# Patient Record
Sex: FEMALE | Race: WHITE | NOT HISPANIC OR LATINO | Employment: UNEMPLOYED | URBAN - METROPOLITAN AREA
[De-identification: names, ages, dates, MRNs, and addresses within clinical notes are randomized per-mention and may not be internally consistent; named-entity substitution may affect disease eponyms.]

---

## 2019-02-21 ENCOUNTER — HOSPITAL ENCOUNTER (EMERGENCY)
Facility: HOSPITAL | Age: 34
Discharge: HOME | End: 2019-02-21
Attending: EMERGENCY MEDICINE
Payer: MEDICAID

## 2019-02-21 VITALS
SYSTOLIC BLOOD PRESSURE: 124 MMHG | TEMPERATURE: 98 F | HEIGHT: 64 IN | BODY MASS INDEX: 22.81 KG/M2 | RESPIRATION RATE: 18 BRPM | OXYGEN SATURATION: 98 % | HEART RATE: 97 BPM | DIASTOLIC BLOOD PRESSURE: 73 MMHG | WEIGHT: 133.63 LBS

## 2019-02-21 DIAGNOSIS — R30.0 DYSURIA: ICD-10-CM

## 2019-02-21 DIAGNOSIS — R03.0 ELEVATED BLOOD PRESSURE READING: ICD-10-CM

## 2019-02-21 DIAGNOSIS — Z20.2 POSSIBLE EXPOSURE TO STD: Primary | ICD-10-CM

## 2019-02-21 LAB

## 2019-02-21 PROCEDURE — 96372 THER/PROPH/DIAG INJ SC/IM: CPT

## 2019-02-21 PROCEDURE — 81003 URINALYSIS AUTO W/O SCOPE: CPT

## 2019-02-21 PROCEDURE — 99284 EMERGENCY DEPT VISIT MOD MDM: CPT | Mod: 25

## 2019-02-21 PROCEDURE — 81025 URINE PREGNANCY TEST: CPT

## 2019-02-21 PROCEDURE — 87491 CHLMYD TRACH DNA AMP PROBE: CPT

## 2019-02-21 PROCEDURE — 25000003 PHARM REV CODE 250: Performed by: PHYSICIAN ASSISTANT

## 2019-02-21 PROCEDURE — 63600175 PHARM REV CODE 636 W HCPCS: Performed by: PHYSICIAN ASSISTANT

## 2019-02-21 RX ORDER — CEFTRIAXONE 250 MG/1
250 INJECTION, POWDER, FOR SOLUTION INTRAMUSCULAR; INTRAVENOUS
Status: COMPLETED | OUTPATIENT
Start: 2019-02-21 | End: 2019-02-21

## 2019-02-21 RX ORDER — AZITHROMYCIN 250 MG/1
1000 TABLET, FILM COATED ORAL
Status: COMPLETED | OUTPATIENT
Start: 2019-02-21 | End: 2019-02-21

## 2019-02-21 RX ADMIN — CEFTRIAXONE SODIUM 250 MG: 250 INJECTION, POWDER, FOR SOLUTION INTRAMUSCULAR; INTRAVENOUS at 10:02

## 2019-02-21 RX ADMIN — AZITHROMYCIN 1000 MG: 250 TABLET, FILM COATED ORAL at 10:02

## 2019-02-22 LAB
C TRACH DNA SPEC QL NAA+PROBE: NOT DETECTED
N GONORRHOEA DNA SPEC QL NAA+PROBE: NOT DETECTED

## 2019-02-22 NOTE — ED PROVIDER NOTES
SCRIBE #1 NOTE: I, Corinne Mack, am scribing for, and in the presence of, Erica Avalos PA-C. I have scribed the entire note.      History      Chief Complaint   Patient presents with    Female  Problem     States burning pain     Rash     To face for 1 month       Review of patient's allergies indicates:   Allergen Reactions    Benadryl allergy decongestant Shortness Of Breath    Sulfa (sulfonamide antibiotics) Swelling        HPI   HPI    2/21/2019, 9:04 PM   History obtained from the patient      History of Present Illness: Jeanie Wong is a 33 y.o. female patient with PMHx of Hep C and cervical CA who presents to the Emergency Department for dysuria which onset gradually a week ago. Pt states she had unprotected sex over a week ago and would like to be screened for STDs as well as a UTI. Symptoms are episodic and moderate in severity. Pt also c/o rash to her face which onset gradually 1 month ago. No mitigating or exacerbating factors reported. No associated sxs reported. Patient denies any fever, chills, CP, SOB, N/V/D, back pain, neck pain, vaginal pain, vaginal bleeding, vaginal d/c, pelvic pain, hematuria, flank pain, HA, dizziness, and all other sxs at this time. No prior Tx reported. No further complaints or concerns at this time. Pt's LNMP was 1.5 weeks ago.        Arrival mode: Personal vehicle    PCP: Primary Doctor No       Past Medical History:  Past Medical History:   Diagnosis Date    Cancer     cervix    Hepatitis C        Past Surgical History:  Past Surgical History:   Procedure Laterality Date    colonoscopy      ESOPHAGOGASTRODUODENOSCOPY      hemorhoidectomy           Family History:  History reviewed. No pertinent family history.    Social History:  Social History     Tobacco Use    Smoking status: Current Every Day Smoker     Packs/day: 0.50     Years: 13.00     Pack years: 6.50     Types: Cigarettes    Smokeless tobacco: Never Used   Substance and Sexual Activity     Alcohol use: No    Drug use: No    Sexual activity: Yes     Partners: Male     Birth control/protection: None       ROS   Review of Systems   Constitutional: Negative for chills and fever.   Respiratory: Negative for cough and shortness of breath.    Cardiovascular: Negative for chest pain and leg swelling.   Gastrointestinal: Negative for abdominal pain, diarrhea, nausea and vomiting.   Genitourinary: Positive for dysuria. Negative for flank pain, hematuria, pelvic pain, vaginal bleeding, vaginal discharge and vaginal pain.   Musculoskeletal: Negative for back pain, neck pain and neck stiffness.   Skin: Positive for rash. Negative for wound.   Neurological: Negative for dizziness, light-headedness, numbness and headaches.   All other systems reviewed and are negative.    Physical Exam      Initial Vitals [02/21/19 2056]   BP Pulse Resp Temp SpO2   124/73 97 18 98.3 °F (36.8 °C) 98 %      MAP       --          Physical Exam  Nursing Notes and Vital Signs Reviewed.  Constitutional: Patient is in no acute distress. Well-developed and well-nourished.  Head: Atraumatic. Normocephalic.  Eyes: PERRL. EOM intact. Conjunctivae are not pale. No scleral icterus.  ENT: Mucous membranes are moist. Oropharynx is clear and symmetric.    Neck: Supple. Full ROM. No lymphadenopathy.  Cardiovascular: Regular rate. Regular rhythm. No murmurs, rubs, or gallops. Distal pulses are 2+ and symmetric.  Pulmonary/Chest: No respiratory distress. Clear to auscultation bilaterally. No wheezing or rales.  Abdominal: Soft and non-distended.  There is no tenderness.  No rebound, guarding, or rigidity.  Genitourinary: No CVA TTP.   Musculoskeletal: Moves all extremities. No obvious deformities. No edema.   Skin: Warm and dry.  Papular rash of face.  Neurological:  Alert, awake, and appropriate.  Normal speech.  No acute focal neurological deficits are appreciated.  Psychiatric: Normal affect. Good eye contact. Appropriate in content.    ED  "Course    Procedures  ED Vital Signs:  Vitals:    02/21/19 2056   BP: 124/73   Pulse: 97   Resp: 18   Temp: 98.3 °F (36.8 °C)   TempSrc: Oral   SpO2: 98%   Weight: 60.6 kg (133 lb 9.6 oz)   Height: 5' 4" (1.626 m)       Abnormal Lab Results:  Labs Reviewed   C. TRACHOMATIS/N. GONORRHOEAE BY AMP DNA   URINALYSIS, REFLEX TO URINE CULTURE    Narrative:     Preferred Collection Type->Urine, Clean Catch   PREGNANCY TEST, URINE RAPID        All Lab Results:  Results for orders placed or performed during the hospital encounter of 02/21/19   Urinalysis, Reflex to Urine Culture Urine, Clean Catch   Result Value Ref Range    Specimen UA Urine, Clean Catch     Color, UA Yellow Yellow, Straw, Maris    Appearance, UA Clear Clear    pH, UA 7.0 5.0 - 8.0    Specific Gravity, UA 1.010 1.005 - 1.030    Protein, UA Negative Negative    Glucose, UA Negative Negative    Ketones, UA Negative Negative    Bilirubin (UA) Negative Negative    Occult Blood UA Negative Negative    Nitrite, UA Negative Negative    Urobilinogen, UA Negative <2.0 EU/dL    Leukocytes, UA Negative Negative   Rapid Pregnancy, Urine   Result Value Ref Range    Preg Test, Ur Negative        Imaging Results:  Imaging Results    None                 The Emergency Provider reviewed the vital signs and test results, which are outlined above.    ED Discussion     9:50 PM: Reassessed pt at this time. Pt is awake, alert, and in no distress. Discussed with pt all pertinent ED information and results. Discussed pt dx and plan of tx. Gave pt all f/u and return to the ED instructions. All questions and concerns were addressed at this time. Pt expresses understanding of information and instructions, and is comfortable with plan to discharge. Pt is stable for discharge.    I discussed with patient and/or family/caretaker that evaluation in the ED does not suggest any emergent or life threatening medical conditions requiring immediate intervention beyond what was provided in the " ED, and I believe patient is safe for discharge.  Regardless, an unremarkable evaluation in the ED does not preclude the development or presence of a serious of life threatening condition. As such, patient was instructed to return immediately for any worsening or change in current symptoms.      ED Medication(s):  Medications   cefTRIAXone injection 250 mg (250 mg Intramuscular Given 2/21/19 2231)   azithromycin tablet 1,000 mg (1,000 mg Oral Given 2/21/19 2230)          Medication List      You have not been prescribed any medications.         Follow-up Information     [facility]. Schedule an appointment as soon as possible for a visit in 3 days.    Why:  PCP and dermatology  Contact information:  [facility] [address]  [place] [address]    Phone:  [phone]                   Medical Decision Making    Medical Decision Making:   Clinical Tests:   Lab Tests: Ordered and Reviewed     Additional MDM:   Smoking Cessation: The patient is a smoker. The patient was counseled on smoking cessation for: 3 minutes. The patient was counseled on tobacco related  health complications.        Scribe Attestation:   Scribe #1: I performed the above scribed service and the documentation accurately describes the services I performed. I attest to the accuracy of the note 02/21/2019.    Attending:   Physician Attestation Statement for Scribe #1: I, Erica Avalos PA-C, personally performed the services described in this documentation, as scribed by Corinne Mack, in my presence, and it is both accurate and complete.          Clinical Impression       ICD-10-CM ICD-9-CM   1. Possible exposure to STD Z20.2 V01.6   2. Dysuria R30.0 788.1   3. Elevated blood pressure reading R03.0 796.2       Disposition:   Disposition: Discharged  Condition: Stable           Erica Avalos PA-C  02/22/19 0129

## 2019-07-15 ENCOUNTER — HOSPITAL ENCOUNTER (EMERGENCY)
Facility: HOSPITAL | Age: 34
Discharge: HOME | End: 2019-07-15
Attending: EMERGENCY MEDICINE
Payer: MEDICAID

## 2019-07-15 VITALS
OXYGEN SATURATION: 98 % | HEART RATE: 87 BPM | WEIGHT: 141.13 LBS | BODY MASS INDEX: 24.1 KG/M2 | SYSTOLIC BLOOD PRESSURE: 120 MMHG | DIASTOLIC BLOOD PRESSURE: 67 MMHG | RESPIRATION RATE: 16 BRPM | HEIGHT: 64 IN | TEMPERATURE: 99 F

## 2019-07-15 DIAGNOSIS — R10.32 BILATERAL LOWER ABDOMINAL CRAMPING: ICD-10-CM

## 2019-07-15 DIAGNOSIS — Z34.90 PREGNANCY, UNSPECIFIED GESTATIONAL AGE: Primary | ICD-10-CM

## 2019-07-15 DIAGNOSIS — R10.31 BILATERAL LOWER ABDOMINAL CRAMPING: ICD-10-CM

## 2019-07-15 LAB
B-HCG UR QL: POSITIVE
BILIRUB UR QL STRIP: NEGATIVE
CLARITY UR: CLEAR
COLOR UR: YELLOW
GLUCOSE UR QL STRIP: NEGATIVE
HCG INTACT+B SERPL-ACNC: 343 MIU/ML
HGB UR QL STRIP: NEGATIVE
KETONES UR QL STRIP: NEGATIVE
LEUKOCYTE ESTERASE UR QL STRIP: NEGATIVE
NITRITE UR QL STRIP: NEGATIVE
PH UR STRIP: 6 [PH] (ref 5–8)
PROT UR QL STRIP: NEGATIVE
SP GR UR STRIP: 1.02 (ref 1–1.03)
URN SPEC COLLECT METH UR: NORMAL
UROBILINOGEN UR STRIP-ACNC: NEGATIVE EU/DL

## 2019-07-15 PROCEDURE — 81003 URINALYSIS AUTO W/O SCOPE: CPT

## 2019-07-15 PROCEDURE — 81025 URINE PREGNANCY TEST: CPT

## 2019-07-15 PROCEDURE — 84702 CHORIONIC GONADOTROPIN TEST: CPT

## 2019-07-15 PROCEDURE — 36415 COLL VENOUS BLD VENIPUNCTURE: CPT

## 2019-07-15 PROCEDURE — 99282 EMERGENCY DEPT VISIT SF MDM: CPT

## 2019-07-16 NOTE — ED PROVIDER NOTES
SCRIBE #1 NOTE: I, Dora Mao, am scribing for, and in the presence of, Deepak Spence NP. I have scribed the HPI, ROS, and PEx.     SCRIBE #2 NOTE: I, Venice Tuckerlando, am scribing for, and in the presence of,  Deepak Spence NP. I have scribed the remaining portions of the note not scribed by Scribe #1.     History      Chief Complaint   Patient presents with    Abdominal Cramping     pt reports positive pregnancy test yesterday. Pt reports lower abdominal cramping. Pt reports that she wants to confirm pregnancy       Review of patient's allergies indicates:   Allergen Reactions    Benadryl allergy decongestant Shortness Of Breath    Sulfa (sulfonamide antibiotics) Swelling        HPI   HPI    7/15/2019, 8:55 PM   History obtained from the patient      History of Present Illness: Jeanie Wong is a 34 y.o. female patient who presents to the Emergency Department for abdominal cramping which onset gradually yesterday. Symptoms are constant and moderate in severity. Patient reports a positive pregnancy test taken last night. No mitigating or exacerbating factors reported. No associated sxs. Patient denies any fever, chills, n/v/d, vaginal bleeding, back pain, and all other sxs at this time. No prior Tx. No further complaints or concerns at this time.          Arrival mode: Personal vehicle     PCP: Primary Doctor No       Past Medical History:  Past Medical History:   Diagnosis Date    Cancer     cervix    Hepatitis C        Past Surgical History:  Past Surgical History:   Procedure Laterality Date    colonoscopy      ESOPHAGOGASTRODUODENOSCOPY      hemorhoidectomy           Family History:  No family history on file.    Social History:  Social History     Tobacco Use    Smoking status: Current Every Day Smoker     Packs/day: 0.50     Years: 13.00     Pack years: 6.50     Types: Cigarettes    Smokeless tobacco: Never Used   Substance and Sexual Activity    Alcohol use: No    Drug use: No    Sexual  activity: Yes     Partners: Male     Birth control/protection: None       ROS   Review of Systems   Constitutional: Negative for chills and fever.   HENT: Negative for sore throat.    Respiratory: Negative for shortness of breath.    Cardiovascular: Negative for chest pain.   Gastrointestinal: Positive for abdominal pain (cramping). Negative for diarrhea, nausea and vomiting.   Genitourinary: Negative for dysuria and vaginal bleeding.   Musculoskeletal: Negative for back pain.   Skin: Negative for rash.   Neurological: Negative for weakness.   Hematological: Does not bruise/bleed easily.   All other systems reviewed and are negative.    Physical Exam      Initial Vitals [07/15/19 2011]   BP Pulse Resp Temp SpO2   117/71 92 18 99.4 °F (37.4 °C) 98 %      MAP       --          Physical Exam  Nursing Notes and Vital Signs Reviewed.  Constitutional: Patient is in no acute distress. Well-developed and well-nourished.  Head: Atraumatic. Normocephalic.  Eyes: PERRL. EOM intact. Conjunctivae are not pale. No scleral icterus.  ENT: Mucous membranes are moist. Oropharynx is clear and symmetric.    Neck: Supple. Full ROM. No lymphadenopathy.  Cardiovascular: Regular rate. Regular rhythm. No murmurs, rubs, or gallops. Distal pulses are 2+ and symmetric.  Pulmonary/Chest: No respiratory distress. Clear to auscultation bilaterally. No wheezing or rales.  Abdominal: Soft and non-distended.  There is no tenderness.  No rebound, guarding, or rigidity. Good bowel sounds.  Genitourinary: No CVA tenderness  Musculoskeletal: Moves all extremities. No obvious deformities. No edema. No calf tenderness.  Skin: Warm and dry.  Neurological:  Alert, awake, and appropriate.  Normal speech.  No acute focal neurological deficits are appreciated.  Psychiatric: Normal affect. Good eye contact. Appropriate in content.    ED Course    Procedures  ED Vital Signs:  Vitals:    07/15/19 2011 07/15/19 2238   BP: 117/71 120/67   Pulse: 92 87   Resp: 18 16  "  Temp: 99.4 °F (37.4 °C) 98.9 °F (37.2 °C)   TempSrc: Oral Oral   SpO2: 98% 98%   Weight: 64 kg (141 lb 1.5 oz)    Height: 5' 4" (1.626 m)        Abnormal Lab Results:  Labs Reviewed   PREGNANCY TEST, URINE RAPID - Abnormal; Notable for the following components:       Result Value    Preg Test, Ur Positive (*)     All other components within normal limits   URINALYSIS, REFLEX TO URINE CULTURE    Narrative:     Preferred Collection Type->Urine, Clean Catch   HCG, QUANTITATIVE, PREGNANCY        All Lab Results:  Positive pregnancy test resulted.  UA is WNL.    Imaging Results:  Imaging Results    None                 The Emergency Provider reviewed the vital signs and test results, which are outlined above.    ED Discussion     10:39 PM: Reassessed pt at this time. Pt does not wish to wait for results or get US. Discussed with pt all pertinent ED information and results. Discussed pt dx and plan of tx. Gave pt all f/u and return to the ED instructions. All questions and concerns were addressed at this time. Pt expresses understanding of information and instructions, and is comfortable with plan to discharge. Pt is stable for discharge.    I discussed with patient that evaluation in the ED does not suggest any emergent or life threatening medical conditions requiring immediate intervention beyond what was provided in the ED, and I believe patient is safe for discharge.  Regardless, an unremarkable evaluation in the ED does not preclude the development or presence of a serious of life threatening condition. As such, patient was instructed to return immediately for any worsening or change in current symptoms.      ED Medication(s):  Medications - No data to display    Follow-up Information     Schedule an appointment as soon as possible for a visit  with [facility].    Specialty:  Obstetrics and Gynecology  Contact information:  [address] [facility] [address] [phone]  Additional " information:  ([address]) 3rd floor           [facility]    Specialty:  Emergency Medicine  Why:  As needed, If symptoms worsen  Contact information:  [address]  [facility] [address] [phone]                   Medical Decision Making    Medical Decision Making:   Clinical Tests:   Lab Tests: Ordered and Reviewed           Scribe Attestation:   Scribe #1: I performed the above scribed service and the documentation accurately describes the services I performed. I attest to the accuracy of the note.    Attending:   Physician Attestation Statement for Scribe #1: I, Deepak Spence NP , personally performed the services described in this documentation, as scribed by Dora Mao, in my presence, and it is both accurate and complete.       Scribe Attestation:   Scribe #2: I performed the above scribed service and the documentation accurately describes the services I performed. I attest to the accuracy of the note.    Attending Attestation:           Physician Attestation for Scribe:    Physician Attestation Statement for Scribe #2: I, Deepak Spence NP, reviewed documentation, as scribed by Venice Stewart in my presence, and it is both accurate and complete. I also acknowledge and confirm the content of the note done by Mai #1.          Clinical Impression       ICD-10-CM ICD-9-CM   1. Pregnancy, unspecified gestational age Z34.90 V22.2   2. Bilateral lower abdominal cramping R10.31 789.09    R10.32        Disposition:   Disposition: Discharged  Condition: Stable         Deepak Spence NP  07/16/19 0141

## 2019-07-18 ENCOUNTER — INITIAL PRENATAL (OUTPATIENT)
Dept: OBSTETRICS AND GYNECOLOGY | Facility: CLINIC | Age: 34
End: 2019-07-18
Payer: MEDICAID

## 2019-07-18 ENCOUNTER — LAB VISIT (OUTPATIENT)
Dept: LAB | Facility: HOSPITAL | Age: 34
End: 2019-07-18
Attending: ADVANCED PRACTICE MIDWIFE
Payer: MEDICAID

## 2019-07-18 VITALS
SYSTOLIC BLOOD PRESSURE: 124 MMHG | BODY MASS INDEX: 24.41 KG/M2 | WEIGHT: 142.19 LBS | DIASTOLIC BLOOD PRESSURE: 80 MMHG

## 2019-07-18 DIAGNOSIS — Z34.80 ENCOUNTER FOR SUPERVISION OF NORMAL PREGNANCY IN MULTIGRAVIDA: ICD-10-CM

## 2019-07-18 DIAGNOSIS — F19.11 HISTORY OF DRUG ABUSE: ICD-10-CM

## 2019-07-18 DIAGNOSIS — Z87.42 HX OF ABNORMAL CERVICAL PAP SMEAR: ICD-10-CM

## 2019-07-18 DIAGNOSIS — B18.2 CHRONIC HEPATITIS C WITHOUT HEPATIC COMA: ICD-10-CM

## 2019-07-18 LAB
ABO + RH BLD: NORMAL
ALBUMIN SERPL BCP-MCNC: 4.1 G/DL (ref 3.5–5.2)
ALP SERPL-CCNC: 60 U/L (ref 55–135)
ALT SERPL W/O P-5'-P-CCNC: 50 U/L (ref 10–44)
AMPHET+METHAMPHET UR QL: NEGATIVE
ANION GAP SERPL CALC-SCNC: 9 MMOL/L (ref 8–16)
AST SERPL-CCNC: 35 U/L (ref 10–40)
BARBITURATES UR QL SCN>200 NG/ML: NEGATIVE
BASOPHILS # BLD AUTO: 0.08 K/UL (ref 0–0.2)
BASOPHILS NFR BLD: 0.9 % (ref 0–1.9)
BENZODIAZ UR QL SCN>200 NG/ML: NEGATIVE
BILIRUB SERPL-MCNC: 0.4 MG/DL (ref 0.1–1)
BLD GP AB SCN CELLS X3 SERPL QL: NORMAL
BUN SERPL-MCNC: 12 MG/DL (ref 6–20)
BZE UR QL SCN: NEGATIVE
CALCIUM SERPL-MCNC: 9.5 MG/DL (ref 8.7–10.5)
CANNABINOIDS UR QL SCN: NEGATIVE
CHLORIDE SERPL-SCNC: 108 MMOL/L (ref 95–110)
CO2 SERPL-SCNC: 22 MMOL/L (ref 23–29)
CREAT SERPL-MCNC: 0.7 MG/DL (ref 0.5–1.4)
CREAT UR-MCNC: 92 MG/DL (ref 15–325)
DIFFERENTIAL METHOD: NORMAL
EOSINOPHIL # BLD AUTO: 0.5 K/UL (ref 0–0.5)
EOSINOPHIL NFR BLD: 5.1 % (ref 0–8)
ERYTHROCYTE [DISTWIDTH] IN BLOOD BY AUTOMATED COUNT: 13.2 % (ref 11.5–14.5)
EST. GFR  (AFRICAN AMERICAN): >60 ML/MIN/1.73 M^2
EST. GFR  (NON AFRICAN AMERICAN): >60 ML/MIN/1.73 M^2
GLUCOSE SERPL-MCNC: 81 MG/DL (ref 70–110)
HCG INTACT+B SERPL-ACNC: 982 MIU/ML
HCT VFR BLD AUTO: 43.5 % (ref 37–48.5)
HGB BLD-MCNC: 14.4 G/DL (ref 12–16)
IMM GRANULOCYTES # BLD AUTO: 0.03 K/UL (ref 0–0.04)
IMM GRANULOCYTES NFR BLD AUTO: 0.3 % (ref 0–0.5)
LYMPHOCYTES # BLD AUTO: 3.2 K/UL (ref 1–4.8)
LYMPHOCYTES NFR BLD: 34.7 % (ref 18–48)
MCH RBC QN AUTO: 30.6 PG (ref 27–31)
MCHC RBC AUTO-ENTMCNC: 33.1 G/DL (ref 32–36)
MCV RBC AUTO: 93 FL (ref 82–98)
METHADONE UR QL SCN>300 NG/ML: NEGATIVE
MONOCYTES # BLD AUTO: 0.7 K/UL (ref 0.3–1)
MONOCYTES NFR BLD: 7.8 % (ref 4–15)
NEUTROPHILS # BLD AUTO: 4.7 K/UL (ref 1.8–7.7)
NEUTROPHILS NFR BLD: 51.2 % (ref 38–73)
NRBC BLD-RTO: 0 /100 WBC
OPIATES UR QL SCN: NEGATIVE
PCP UR QL SCN>25 NG/ML: NEGATIVE
PLATELET # BLD AUTO: 243 K/UL (ref 150–350)
PMV BLD AUTO: 11.8 FL (ref 9.2–12.9)
POTASSIUM SERPL-SCNC: 4.1 MMOL/L (ref 3.5–5.1)
PROT SERPL-MCNC: 7.3 G/DL (ref 6–8.4)
RBC # BLD AUTO: 4.7 M/UL (ref 4–5.4)
SODIUM SERPL-SCNC: 139 MMOL/L (ref 136–145)
TOXICOLOGY INFORMATION: NORMAL
WBC # BLD AUTO: 9.08 K/UL (ref 3.9–12.7)

## 2019-07-18 PROCEDURE — 87481 CANDIDA DNA AMP PROBE: CPT | Mod: 59

## 2019-07-18 PROCEDURE — 99212 OFFICE O/P EST SF 10 MIN: CPT | Mod: PBBFAC,TH,25 | Performed by: ADVANCED PRACTICE MIDWIFE

## 2019-07-18 PROCEDURE — 85025 COMPLETE CBC W/AUTO DIFF WBC: CPT

## 2019-07-18 PROCEDURE — 86703 HIV-1/HIV-2 1 RESULT ANTBDY: CPT

## 2019-07-18 PROCEDURE — 87086 URINE CULTURE/COLONY COUNT: CPT

## 2019-07-18 PROCEDURE — 80053 COMPREHEN METABOLIC PANEL: CPT

## 2019-07-18 PROCEDURE — 36415 COLL VENOUS BLD VENIPUNCTURE: CPT

## 2019-07-18 PROCEDURE — 99999 PR PBB SHADOW E&M-EST. PATIENT-LVL II: ICD-10-PCS | Mod: PBBFAC,,, | Performed by: ADVANCED PRACTICE MIDWIFE

## 2019-07-18 PROCEDURE — 87491 CHLMYD TRACH DNA AMP PROBE: CPT

## 2019-07-18 PROCEDURE — 88142 CYTOPATH C/V THIN LAYER: CPT

## 2019-07-18 PROCEDURE — 80307 DRUG TEST PRSMV CHEM ANLYZR: CPT

## 2019-07-18 PROCEDURE — 86762 RUBELLA ANTIBODY: CPT

## 2019-07-18 PROCEDURE — 87522 HEPATITIS C REVRS TRNSCRPJ: CPT

## 2019-07-18 PROCEDURE — 86901 BLOOD TYPING SEROLOGIC RH(D): CPT

## 2019-07-18 PROCEDURE — 99204 OFFICE O/P NEW MOD 45 MIN: CPT | Mod: TH,S$PBB,, | Performed by: ADVANCED PRACTICE MIDWIFE

## 2019-07-18 PROCEDURE — 84702 CHORIONIC GONADOTROPIN TEST: CPT

## 2019-07-18 PROCEDURE — 99999 PR PBB SHADOW E&M-EST. PATIENT-LVL II: CPT | Mod: PBBFAC,,, | Performed by: ADVANCED PRACTICE MIDWIFE

## 2019-07-18 PROCEDURE — 87340 HEPATITIS B SURFACE AG IA: CPT

## 2019-07-18 PROCEDURE — 99204 PR OFFICE/OUTPT VISIT, NEW, LEVL IV, 45-59 MIN: ICD-10-PCS | Mod: TH,S$PBB,, | Performed by: ADVANCED PRACTICE MIDWIFE

## 2019-07-18 PROCEDURE — 86592 SYPHILIS TEST NON-TREP QUAL: CPT

## 2019-07-18 RX ORDER — FERROUS SULFATE 325(65) MG
TABLET ORAL
COMMUNITY

## 2019-07-18 RX ORDER — VALACYCLOVIR HYDROCHLORIDE 1 G/1
TABLET, FILM COATED ORAL
Refills: 0 | COMMUNITY
Start: 2019-06-13

## 2019-07-18 RX ORDER — METHYLPREDNISOLONE 4 MG/1
TABLET ORAL
COMMUNITY
End: 2019-07-18

## 2019-07-18 RX ORDER — DOXYCYCLINE 100 MG/1
CAPSULE ORAL
COMMUNITY
End: 2019-07-18

## 2019-07-18 RX ORDER — NALTREXONE HYDROCHLORIDE 50 MG/1
TABLET, FILM COATED ORAL
COMMUNITY
End: 2019-07-18

## 2019-07-18 NOTE — PROGRESS NOTES
CHIEF COMPLAINT:   Patient presents with      Possible Pregnancy        HISTORY OF PRESENT ILLNESS  Jeanie Wong 34 y.o.  presents for pregnancy risk assessment.   The patient has no complaints today.  No nausea or vomiting. No bleeding or pain.  Pregnancy was not  planned but is desired.  Partner is supportive of pregnancy.  Lives at [facility].  No pets at home.  Works a [employer].  Denies domestic abuse.  Denies chemical/pesticide/radiation exposure.  FOC is [place].  Both have hepatitis C    OB history:      LMP: Patient's last menstrual period was 2019.  EDC:  3/22/2019  EGA:  4w4d    Health Maintenance   Topic Date Due    Lipid Panel  1984    TETANUS VACCINE  2002    Pneumococcal Vaccine (Medium Risk) (1 of 1 - PPSV23) 2003    Pap Smear with HPV Cotest  2005    Influenza Vaccine  2019       Past Medical History:   Diagnosis Date    Cancer     cervix    Hepatitis C        Past Surgical History:   Procedure Laterality Date    colonoscopy      ESOPHAGOGASTRODUODENOSCOPY      hemorhoidectomy         Family History   Problem Relation Age of Onset    Cancer Mother     Cancer Maternal Aunt        Social History     Socioeconomic History    Marital status: Legally      Spouse name: Not on file    Number of children: Not on file    Years of education: Not on file    Highest education level: Not on file   Occupational History    Not on file   Social Needs    Financial resource strain: Not on file    Food insecurity:     Worry: Not on file     Inability: Not on file    Transportation needs:     Medical: Not on file     Non-medical: Not on file   Tobacco Use    Smoking status: Current Every Day Smoker     Packs/day: 0.50     Years: 13.00     Pack years: 6.50     Types: Cigarettes    Smokeless tobacco: Never Used   Substance and Sexual Activity    Alcohol use: No    Drug use: No    Sexual activity: Yes      Partners: Male     Birth control/protection: None   Lifestyle    Physical activity:     Days per week: Not on file     Minutes per session: Not on file    Stress: Not on file   Relationships    Social connections:     Talks on phone: Not on file     Gets together: Not on file     Attends [employer] service: Not on file     Active member of club or organization: Not on file     Attends meetings of clubs or organizations: Not on file     Relationship status: Not on file   Other Topics Concern    Not on file   Social History Narrative    Not on file       Current Outpatient Medications   Medication Sig Dispense Refill    ferrous sulfate (FEOSOL) 325 mg (65 mg iron) Tab tablet ferrous sulfate 325 mg (65 mg iron) tablet      valACYclovir (VALTREX) 1000 MG tablet TK 1 T PO Q 8 H  0     No current facility-administered medications for this visit.        Review of patient's allergies indicates:   Allergen Reactions    Benadryl allergy decongestant Shortness Of Breath    Sulfa (sulfonamide antibiotics) Swelling         PHYSICAL EXAM   Vitals:    07/18/19 1425   BP: 124/80        PAIN SCALE: 0/10 None    PHYSICAL EXAM    ROS:  GENERAL: No fever, chills, fatigability or weight loss.  CV: Denies chest pain  PULM: Denies shortness of breath or wheezing.  ABDOMEN: Appetite fine. No weight loss. Denies diarrhea, abdominal pain, hematemesis or blood in stool.  URINARY: No flank pain, dysuria or hematuria.  REPRODUCTIVE: No abnormal vaginal bleeding.       PE:   APPEARANCE: Well nourished, well developed, in no acute distress  CHEST: Clear to auscultation bilaterally  CV: Regular rate and rhythm  BREASTS: Symmetrical, no skin changes or visible lesions. No palpable masses, nipple discharge or adenopathy bilaterally.  ABDOMEN: Soft. No tenderness or masses. No hepatosplenomegaly. No hernias  PELVIC:   VULVA: No lesions. Normal female genitalia.  URETHRAL MEATUS: Normal size and location, no lesions, no prolapse.  URETHRA: No  masses, tenderness, prolapse or scarring.  VAGINA: Moist and well rugated, no discharge, no significant cystocele or rectocele.  CERVIX: No lesions, normal diameter, no stenosis, no cervical motion tenderness.   UTERUS: normal size, regular shape, mobile, non-tender, normal position, good support.  ADNEXA: No masses, tenderness or CDS nodularity.  ANUS PERINEUM: No lesions, no relaxation, no external hemorrhoids.     UPT +    A/P:     -      Patient was counseled today on A.C.S. Pap guidelines and recommendations for yearly pelvic exams, mammograms and monthly self breast exams; to see her PCP for other health maintenance and pregnancy.   -      Patient's medications and medical history reviewed with patient and implications in pregnancy.   -      Pregnancy course discussed and 'AtoZ' book given. Patient was counseled on proper weight gain based on the [place] of [facility]'s recommendations based on her pre-pregnancy weight. Discussed foods to avoid in pregnancy (i.e. sushi, fish that are high in mercury, deli meat, and unpasteurized cheeses). Discussed prenatal vitamin options (i.e. stool softener, DHA). Discussed potential medical problems in pregnancy.  -     Discussed risk of Toxoplasmosis transmission from pets and reviewed risk reduction techniques.  -     Discused increased risks with AMA status.    -     Chromosomal abnormality risk discussed and available testing offered discussed   -     Pt was counseled on exercise in pregnancy and weight gain recommendations.  -     Pt was counseled on travel recommendations and on risks of Zika virus exposure.  Current CDC Zika advisories and prevention techniques were reviewed with pt.  Pt denies any recent international travel and  does not plan travel during pregnancy.  Pt reports that partner does not plan travel either  -     Oriented to practice including CNM collaboration.     NOB labs, PAP, and cultures today X  hAS HER 2 OTHER CHILDREN  RTC 4 weeks with  OB US

## 2019-07-19 LAB
BACTERIA UR CULT: NORMAL
BACTERIAL VAGINOSIS DNA: POSITIVE
C TRACH DNA SPEC QL NAA+PROBE: NOT DETECTED
CANDIDA GLABRATA DNA: NEGATIVE
CANDIDA KRUSEI DNA: NEGATIVE
CANDIDA RRNA VAG QL PROBE: NEGATIVE
HBV SURFACE AG SERPL QL IA: NEGATIVE
HIV 1+2 AB+HIV1 P24 AG SERPL QL IA: NEGATIVE
N GONORRHOEA DNA SPEC QL NAA+PROBE: NOT DETECTED
RPR SER QL: NORMAL
RUBV IGG SER-ACNC: 65.1 IU/ML
RUBV IGG SER-IMP: REACTIVE
T VAGINALIS RRNA GENITAL QL PROBE: NEGATIVE

## 2019-07-23 LAB
HCV RNA SERPL NAA+PROBE-LOG IU: 4.92 LOG (10) IU/ML
HCV RNA SERPL QL NAA+PROBE: DETECTED IU/ML
HCV RNA SPEC NAA+PROBE-ACNC: ABNORMAL IU/ML

## 2019-07-24 DIAGNOSIS — B18.2 CHRONIC HEPATITIS C WITH HEPATIC COMA: Primary | ICD-10-CM

## 2022-04-14 ENCOUNTER — HISTORICAL (OUTPATIENT)
Dept: ADMINISTRATIVE | Facility: HOSPITAL | Age: 37
End: 2022-04-14

## 2022-06-07 ENCOUNTER — HOSPITAL ENCOUNTER (OUTPATIENT)
Dept: RADIOLOGY | Facility: HOSPITAL | Age: 37
Discharge: HOME | End: 2022-06-07
Attending: NURSE PRACTITIONER
Payer: MEDICAID

## 2022-06-07 ENCOUNTER — OFFICE VISIT (OUTPATIENT)
Dept: ORTHOPEDICS | Facility: CLINIC | Age: 37
End: 2022-06-07
Payer: MEDICAID

## 2022-06-07 VITALS
BODY MASS INDEX: 27.83 KG/M2 | HEART RATE: 92 BPM | WEIGHT: 163 LBS | DIASTOLIC BLOOD PRESSURE: 72 MMHG | RESPIRATION RATE: 16 BRPM | TEMPERATURE: 98 F | OXYGEN SATURATION: 98 % | SYSTOLIC BLOOD PRESSURE: 107 MMHG | HEIGHT: 64 IN

## 2022-06-07 DIAGNOSIS — M17.12 PRIMARY OSTEOARTHRITIS OF LEFT KNEE: ICD-10-CM

## 2022-06-07 DIAGNOSIS — M25.562 ACUTE PAIN OF LEFT KNEE: ICD-10-CM

## 2022-06-07 DIAGNOSIS — M23.92 INTERNAL DERANGEMENT OF LEFT KNEE: Primary | ICD-10-CM

## 2022-06-07 PROCEDURE — 1160F RVW MEDS BY RX/DR IN RCRD: CPT | Mod: CPTII,,, | Performed by: NURSE PRACTITIONER

## 2022-06-07 PROCEDURE — 1159F PR MEDICATION LIST DOCUMENTED IN MEDICAL RECORD: ICD-10-PCS | Mod: CPTII,,, | Performed by: NURSE PRACTITIONER

## 2022-06-07 PROCEDURE — 99215 PR OFFICE/OUTPT VISIT, EST, LEVL V, 40-54 MIN: ICD-10-PCS | Mod: 25,S$PBB,, | Performed by: NURSE PRACTITIONER

## 2022-06-07 PROCEDURE — 99215 OFFICE O/P EST HI 40 MIN: CPT | Mod: 25,S$PBB,, | Performed by: NURSE PRACTITIONER

## 2022-06-07 PROCEDURE — 3078F PR MOST RECENT DIASTOLIC BLOOD PRESSURE < 80 MM HG: ICD-10-PCS | Mod: CPTII,,, | Performed by: NURSE PRACTITIONER

## 2022-06-07 PROCEDURE — 3008F BODY MASS INDEX DOCD: CPT | Mod: CPTII,,, | Performed by: NURSE PRACTITIONER

## 2022-06-07 PROCEDURE — 3078F DIAST BP <80 MM HG: CPT | Mod: CPTII,,, | Performed by: NURSE PRACTITIONER

## 2022-06-07 PROCEDURE — 20610 LARGE JOINT ASPIRATION/INJECTION: L KNEE: ICD-10-PCS | Mod: S$PBB,LT,, | Performed by: NURSE PRACTITIONER

## 2022-06-07 PROCEDURE — 1159F MED LIST DOCD IN RCRD: CPT | Mod: CPTII,,, | Performed by: NURSE PRACTITIONER

## 2022-06-07 PROCEDURE — 20610 DRAIN/INJ JOINT/BURSA W/O US: CPT | Mod: PBBFAC,LT | Performed by: NURSE PRACTITIONER

## 2022-06-07 PROCEDURE — 3074F SYST BP LT 130 MM HG: CPT | Mod: CPTII,,, | Performed by: NURSE PRACTITIONER

## 2022-06-07 PROCEDURE — 99214 OFFICE O/P EST MOD 30 MIN: CPT | Mod: PBBFAC,25 | Performed by: NURSE PRACTITIONER

## 2022-06-07 PROCEDURE — 3074F PR MOST RECENT SYSTOLIC BLOOD PRESSURE < 130 MM HG: ICD-10-PCS | Mod: CPTII,,, | Performed by: NURSE PRACTITIONER

## 2022-06-07 PROCEDURE — 1160F PR REVIEW ALL MEDS BY PRESCRIBER/CLIN PHARMACIST DOCUMENTED: ICD-10-PCS | Mod: CPTII,,, | Performed by: NURSE PRACTITIONER

## 2022-06-07 PROCEDURE — 3008F PR BODY MASS INDEX (BMI) DOCUMENTED: ICD-10-PCS | Mod: CPTII,,, | Performed by: NURSE PRACTITIONER

## 2022-06-07 PROCEDURE — 73564 X-RAY EXAM KNEE 4 OR MORE: CPT | Mod: TC,LT

## 2022-06-07 RX ORDER — LIDOCAINE HYDROCHLORIDE 10 MG/ML
5 INJECTION, SOLUTION EPIDURAL; INFILTRATION; INTRACAUDAL; PERINEURAL
Status: COMPLETED | OUTPATIENT
Start: 2022-06-07 | End: 2022-06-07

## 2022-06-07 RX ORDER — TRIAMCINOLONE ACETONIDE 40 MG/ML
40 INJECTION, SUSPENSION INTRA-ARTICULAR; INTRAMUSCULAR
Status: DISCONTINUED | OUTPATIENT
Start: 2022-06-07 | End: 2022-06-07 | Stop reason: HOSPADM

## 2022-06-07 RX ORDER — MELOXICAM 15 MG/1
15 TABLET ORAL DAILY
Qty: 30 TABLET | Refills: 0 | Status: SHIPPED | OUTPATIENT
Start: 2022-06-07 | End: 2022-07-07

## 2022-06-07 RX ORDER — LIDOCAINE HYDROCHLORIDE 10 MG/ML
5 INJECTION, SOLUTION EPIDURAL; INFILTRATION; INTRACAUDAL; PERINEURAL
Status: DISCONTINUED | OUTPATIENT
Start: 2022-06-07 | End: 2022-06-07 | Stop reason: HOSPADM

## 2022-06-07 RX ADMIN — TRIAMCINOLONE ACETONIDE 40 MG: 40 INJECTION, SUSPENSION INTRA-ARTICULAR; INTRAMUSCULAR at 12:06

## 2022-06-07 RX ADMIN — LIDOCAINE HYDROCHLORIDE 5 ML: 10 INJECTION, SOLUTION EPIDURAL; INFILTRATION; INTRACAUDAL; PERINEURAL at 12:06

## 2022-06-07 RX ADMIN — LIDOCAINE HYDROCHLORIDE 50 MG: 10 INJECTION, SOLUTION EPIDURAL; INFILTRATION; INTRACAUDAL; PERINEURAL at 02:06

## 2022-06-07 NOTE — PROGRESS NOTES
"  Subjective:       New pt .  Patient ID: Jeanie Wong is a 37 y.o. female. Non-smoker. Employment HX: min, currently unemployed.    Seen [facility] ortho for same DX since n/a.     Chief Complaint: Pain of the Left Knee    HPI:    Left aching medial knee pain.   Injury: no known injury  Onset: several years ago fluctuates   Modifying Factors: worse with activity, improves with rest, stiffness after immobilization and improves with less than 30 minutes activity  Associated Symptoms: "popping", swelling and decreased ROM   Activity: normal activity without exercise or sports activity and pain mildly interferes with ADLs   Previous Treatments:  soft knee splint, BMI reduction ongoing education and RX NSAIDs without symptoms relief  PMH: negative for contraindications for NSAID use  Family History: + OA    Note: Referred for left knee pain with no conservative treatments attempted.     Current Outpatient Medications:     ferrous sulfate (FEOSOL) 325 mg (65 mg iron) Tab tablet, ferrous sulfate 325 mg (65 mg iron) tablet, Disp: , Rfl:     meloxicam (MOBIC) 15 MG tablet, Take 1 tablet (15 mg total) by mouth once daily., Disp: 30 tablet, Rfl: 0    valACYclovir (VALTREX) 1000 MG tablet, TK 1 T PO Q 8 H, Disp: , Rfl: 0    Current Facility-Administered Medications:     LIDOcaine (PF) 10 mg/ml (1%) injection 50 mg, 5 mL, Other, 1 time in Clinic/HOD, Erica Vaughn NP    Review of patient's allergies indicates:   Allergen Reactions    Benadryl allergy decongestant Shortness Of Breath    Diphenhydramine hcl     Sulfa (sulfonamide antibiotics) Swelling       No results found for: HGBA1C   Body mass index is 27.98 kg/m².   Vitals:    06/07/22 1313   BP: 107/72   Pulse: 92   Resp: 16   Temp: 97.9 °F (36.6 °C)   SpO2: 98%   Weight: 73.9 kg (163 lb)   Height: 5' 4" (1.626 m)   PainSc:   8      Review of Systems:  A ten-point review of systems was performed and is negative, except as mentioned above     "   Objective:      MSK Bilateral Knee  General:  No apparent distress, no pain indicators, obese                                                                                                        Inspection: limping gait, moderate effusion, full weight bearing, no erythema, no contusion, mild quad deconditioning, varus deformity   Palpation: tenderness with palpation at medial joint line, peripatellar soft tissue  ROM:     Active Flexion / Extension (0-140):  0 / 130 non-painful (R)  3 / 101 painful (L)  Strength:    Flexion  5 / 5 (R)   4 / 5 (L)   Extension  5 / 5 (R)   4 / 5 (L)  Special Testing:   IT Band Testing  o Susy's Test:    -- (R)  + (L)   Effusion Testing  o Ballotable Effusion:   -- (R)  -- (L)  o Fluid Wave:    -- (R)  -- (L)   Patellar Testing  o Patellar Grind:    -- (R)  + (L)  o Patellar Facet Pain:   -- (R)  + (L)  o Apprehension:    -- (R)  -- (L)   Meniscal Testing  o Carolann's test:     -- (R)  + (L)  o Thessaly's Test:      Not tested (R)  not tested (L)   Ligament Testing  o ACL Anterior Drawer:   -- (R) + (L)  o PCL Posterior Drawer:   -- (R) -- (L)  o LCL Varus Test:    -- (R) -- (L)  o MCL Valgus Test:   -- (R) -- (L)  Hip Exam normal  Ankle Exam normal  Neurovascular: Intact to light touch  Neuro/ Psych: Awake, alert, oriented, normal mood and affect  Lymphatic: No LAD  Skin/ Soft Tissue: no rash, skin intact    Assessment:       Imaging: X-ray ordered, reviewed and independently interpreted by me. Discussed with patient. Noted no acute findings, DJD noted, awaiting radiologist findings    EMR Review: completed with noted Referral documentation reviewed    1. Internal derangement of left knee    2. Primary osteoarthritis of left knee    3. Acute pain of left knee    4. BMI 27.0-27.9,adult      Plan:       1. Ongoing education about DX and treatment recommendations including conservative treatments of daily HEP with TheraBand, BMI reduction goal 5-10% of body weight (120#  overall goal), muscle strengthening with use of stationary bike (RPM set at 80 or > with slow progression to goal of 40 minutes 3-4 times per week as tolerated), adequate vit D/C, glucosamine 1500 mg/day and daily acetaminophen 1000 mg 3 times/ day if able to tolerate.    2. Treatment plan: Moderate-to-severe knee arthritis with moderate effusion and exam indicating possible internal derangement of left knee Recommend starting initial conservative treatments including: RX po NSAID see below, formal RX PT, instructed to contact insurance for provider, CSI and drainage.  Medication instructions below.  If inadequate at f/u will consider MRI.   3. Procedure: CSI v. VS injections discussed, s/t failed conservative treatments patient consents to CSI today  4. RX Medications: continue medications as RX per PCP; RX meloxicam as directed, medications precautions given.   5. RTC: 6 weeks and telemedicine visit    Erica Vaughn FNP    Large Joint Aspiration/Injection: L knee    Date/Time: 6/7/2022 12:40 PM  Performed by: Erica Vaughn NP  Authorized by: Erica Vaughn NP     Consent Done?:  Yes (Written)  Indications:  Arthritis  Site marked: the procedure site was marked    Timeout: prior to procedure the correct patient, procedure, and site was verified      Details:  Needle Size:  21 G  Ultrasonic Guidance for needle placement?: No    Approach:  Lateral  Location:  Knee  Site:  L knee  Medications:  5 mL LIDOcaine (PF) 10 mg/ml (1%) 10 mg/mL (1 %); 40 mg triamcinolone acetonide 40 mg/mL  Aspirate amount (mL):  40  Aspirate:  Bloody  Patient tolerance:  Patient tolerated the procedure well with no immediate complications     Topical ethyl chloride was applied. Contents of syringe included: 5cc of 1% of lidocaine with 40mg of Kenalog with 5 ml of 1% lidocaine used for anesthetic for drainage.  Complications: None   EBL: None   Post Procedure: Patient alert, and moving all extremities. ROM improved, pain  decreased.  Good peripheral pulses, no signs of vascular compromise and range of motion intact.  Aftercare instructions were given to patient at time of discharge.  Relative rest for 3 days-avoiding excess activity.  Place ice on the area for 15 minutes every 4-6 hours. Patient may take Tylenol a 1000 mg b.i.d. or ibuprofen 600 mg t.i.d. for the next 3-4 days if not on medication already and safe to take pending co-morbidities.  Protect the area for the next 1-8 hours if anesthetic was used.  Avoid excessive activity for the next 3-4 weeks.  ER precautions given for fever, severe joint pain or allergic reaction or other new symptoms related to the joint injection.         Timed Billing Note  Total Time Spent with Patient: 40 minutes  Visit Start Time: 1320  10 minutes spent prior to exam reviewing EMR, prior labs and x-rays.  20 minutes spent in exam with patient completing exam, obtaining history, educating on DX and treatment plan.  10 minutes spent after exam completing EHR documentation.   Visit End Time: 1400

## 2022-06-07 NOTE — LETTER
June 7, 2022      [facility]  [address] [facility] [address]  Phone: [phone]       Patient: Jeanie Wong   YOB: 1985  Date of Visit: 06/07/2022    To Whom It May Concern:    Yuri Wong  was at [facility] on 06/07/2022. The patient may return to work/school on 8 June 2022. Patient does have some minor restrictions which are no prolonged standing > 2 hours, and frequent rest periods. If you have any questions or concerns, or if I can be of further assistance, please do not hesitate to contact me.    Sincerely,    Chente Carpenter RN

## 2022-09-28 ENCOUNTER — OFFICE VISIT (OUTPATIENT)
Dept: ORTHOPEDICS | Facility: CLINIC | Age: 37
End: 2022-09-28
Payer: MEDICAID

## 2022-09-28 VITALS — WEIGHT: 158 LBS | BODY MASS INDEX: 26.98 KG/M2 | HEIGHT: 64 IN

## 2022-09-28 DIAGNOSIS — M23.92 INTERNAL DERANGEMENT OF LEFT KNEE: ICD-10-CM

## 2022-09-28 DIAGNOSIS — M25.462 EFFUSION OF KNEE JOINT, LEFT: Primary | ICD-10-CM

## 2022-09-28 DIAGNOSIS — M17.12 PRIMARY OSTEOARTHRITIS OF LEFT KNEE: ICD-10-CM

## 2022-09-28 PROCEDURE — 3008F BODY MASS INDEX DOCD: CPT | Mod: CPTII,,, | Performed by: NURSE PRACTITIONER

## 2022-09-28 PROCEDURE — 1160F RVW MEDS BY RX/DR IN RCRD: CPT | Mod: CPTII,,, | Performed by: NURSE PRACTITIONER

## 2022-09-28 PROCEDURE — 1159F MED LIST DOCD IN RCRD: CPT | Mod: CPTII,,, | Performed by: NURSE PRACTITIONER

## 2022-09-28 PROCEDURE — 99214 OFFICE O/P EST MOD 30 MIN: CPT | Mod: S$PBB,,, | Performed by: NURSE PRACTITIONER

## 2022-09-28 PROCEDURE — 1159F PR MEDICATION LIST DOCUMENTED IN MEDICAL RECORD: ICD-10-PCS | Mod: CPTII,,, | Performed by: NURSE PRACTITIONER

## 2022-09-28 PROCEDURE — 1160F PR REVIEW ALL MEDS BY PRESCRIBER/CLIN PHARMACIST DOCUMENTED: ICD-10-PCS | Mod: CPTII,,, | Performed by: NURSE PRACTITIONER

## 2022-09-28 PROCEDURE — 99214 PR OFFICE/OUTPT VISIT, EST, LEVL IV, 30-39 MIN: ICD-10-PCS | Mod: S$PBB,,, | Performed by: NURSE PRACTITIONER

## 2022-09-28 PROCEDURE — 3008F PR BODY MASS INDEX (BMI) DOCUMENTED: ICD-10-PCS | Mod: CPTII,,, | Performed by: NURSE PRACTITIONER

## 2022-09-28 PROCEDURE — 99213 OFFICE O/P EST LOW 20 MIN: CPT | Mod: PBBFAC | Performed by: NURSE PRACTITIONER

## 2022-09-28 RX ORDER — MELOXICAM 15 MG/1
15 TABLET ORAL DAILY
Qty: 30 TABLET | Refills: 0 | Status: SHIPPED | OUTPATIENT
Start: 2022-09-28 | End: 2022-10-28

## 2022-09-28 NOTE — PROGRESS NOTES
"  Subjective:       Follow up .  Patient ID: Jeanie Wong is a 37 y.o. female. Non-smoker. Employment HX: min, currently unemployed.    Seen [facility] ortho for same DX since n/a.     Chief Complaint: Pain of the Left Knee and Follow-up    HPI:    Left aching medial knee pain.   Injury: no known injury  Onset: several years ago fluctuates   Modifying Factors: worse with activity, improves with rest, stiffness after immobilization and improves with less than 30 minutes activity  Associated Symptoms: "popping", swelling and decreased ROM   Activity: normal activity without exercise or sports activity and pain mildly interferes with ADLs   Previous Treatments:  BMI reduction ongoing education, soft knee splint, HEP withTheraBand, RX NSAIDs, and CSI since 2022 last injection 6/7/22 with drainage of 40 ml clear yellow fluid with return of effusion within a few weeks  without symptoms relief  PMH: negative for contraindications for NSAID use  Family History: + OA    Note: CSI with drainage of 40 ml fluid with great relief x2-3 weeks then symptoms returned including instability and swelling with mechanical locking. Desires surgical treatment options.  Requesting refill on RX meloxicam     Current Outpatient Medications:     ferrous sulfate (FEOSOL) 325 mg (65 mg iron) Tab tablet, ferrous sulfate 325 mg (65 mg iron) tablet, Disp: , Rfl:     valACYclovir (VALTREX) 1000 MG tablet, TK 1 T PO Q 8 H, Disp: , Rfl: 0    meloxicam (MOBIC) 15 MG tablet, Take 1 tablet (15 mg total) by mouth once daily., Disp: 30 tablet, Rfl: 0    Review of patient's allergies indicates:   Allergen Reactions    Benadryl allergy decongestant Shortness Of Breath    Diphenhydramine hcl     Sulfa (sulfonamide antibiotics) Swelling       No results found for: HGBA1C   Body mass index is 27.12 kg/m².   Vitals:    09/28/22 1444   Weight: 71.7 kg (158 lb)   Height: 5' 4" (1.626 m)   PainSc:   8      Review of Systems:  A ten-point review of systems was " performed and is negative, except as mentioned above   Objective:      MSK Bilateral Knee  General:  No apparent distress, no pain indicators, obese                                                                                                        Inspection: limping gait, moderate effusion, full weight bearing, no erythema, no contusion, mild quad deconditioning, varus deformity   Palpation: tenderness with palpation at medial joint line, peripatellar soft tissue  ROM:    Active Flexion / Extension (0-140):  0 / 130 non-painful (R)  3 / 101 painful (L)  Strength:   Flexion  5 / 5 (R)   4 / 5 (L)  Extension  5 / 5 (R)   4 / 5 (L)  Special Testing:  IT Band Testing  Susy's Test:    -- (R)  + (L)  Effusion Testing  Ballotable Effusion:   -- (R)  -- (L)  Fluid Wave:    -- (R)  -- (L)  Patellar Testing  Patellar Grind:    -- (R)  + (L)  Patellar Facet Pain:   -- (R)  + (L)  Apprehension:    -- (R)  -- (L)  Meniscal Testing  Carolann's test:     -- (R)  + (L)  Thessaly's Test:      Not tested (R)  not tested (L)  Ligament Testing  ACL Anterior Drawer:   -- (R) + (L)  PCL Posterior Drawer:   -- (R) -- (L)  LCL Varus Test:    -- (R) -- (L)  MCL Valgus Test:   -- (R) -- (L)  Hip Exam normal  Ankle Exam normal  Neurovascular: Intact to light touch  Neuro/ Psych: Awake, alert, oriented, normal mood and affect  Lymphatic: No LAD  Skin/ Soft Tissue: no rash, skin intact  Assessment:       Imaging: X-ray 4 views of left knee dated 6/7/22 reviewed and independently interpreted by me.  Discussed with patient. Noted no acute, DJD noted.    XR KNEE COMP 4 OR MORE VIEWS LEFT 6/7/22  CLINICAL HISTORY:  Pain in left knee  TECHNIQUE:  Four views of the left knee.  COMPARISON:  04/14/2022  FINDINGS:  No acute fracture.  Appearance of increasing loss of joint space of the medial compartment.  No effusion.  The soft tissues are grossly unremarkable.  Impression:  Increasing loss of joint space in the medial compartment.  No acute  fracture.    EMR Review: completed with noted Referral documentation reviewed    1. Effusion of knee joint, left    2. Internal derangement of left knee    3. Primary osteoarthritis of left knee    4. BMI 27.0-27.9,adult      Plan:       Ongoing education about DX and treatment recommendations including conservative treatments of daily HEP with TheraBand, BMI reduction goal 5-10% of body weight (120# overall goal), muscle strengthening with use of stationary bike (RPM set at 80 or > with slow progression to goal of 40 minutes 3-4 times per week as tolerated), adequate vit D/C, glucosamine 1500 mg/day and daily acetaminophen 1000 mg 3 times/ day if able to tolerate.    Treatment plan: Moderate-to-severe knee arthritis with moderate effusion and exam indicating possible internal derangement of left knee MRI ordered s/t failed conservative treatments including: RX NSAID, CSI, HEP > 3 months, pain management , and drainage with effusion reoccurring  with inadequate relief.  Patient continues with symptoms of meniscal injury and ligament injury despite > 6 weeks treatment.  MRI required to evaluate need for surgical treatments.    Procedure: n/a  RX Medications: continue medications as RX per PCP; RX meloxicam as directed, medications precautions given.   RTC: after imaging complete    Erica CONTRERAS    Timed Billing Note  Total Time Spent with Patient: 30 minutes  Visit Start Time: 1320  10 minutes spent prior to exam reviewing EMR, prior labs and x-rays.  10 minutes spent in exam with patient completing exam, obtaining history, educating on DX and treatment plan.  10 minutes spent after exam completing EHR documentation.   Visit End Time: 1350

## 2022-10-10 ENCOUNTER — OFFICE VISIT (OUTPATIENT)
Dept: ORTHOPEDICS | Facility: CLINIC | Age: 37
End: 2022-10-10
Payer: MEDICAID

## 2022-10-10 VITALS — BODY MASS INDEX: 26.98 KG/M2 | HEIGHT: 64 IN | WEIGHT: 158 LBS

## 2022-10-10 DIAGNOSIS — M25.462 EFFUSION, LEFT KNEE: ICD-10-CM

## 2022-10-10 DIAGNOSIS — M17.12 ARTHROPATHY OF LEFT KNEE: ICD-10-CM

## 2022-10-10 DIAGNOSIS — M23.8X9 CHONDRAL DEFECT OF FEMORAL CONDYLE, UNSPECIFIED LATERALITY: Primary | ICD-10-CM

## 2022-10-10 PROCEDURE — 99214 OFFICE O/P EST MOD 30 MIN: CPT | Mod: 95,,, | Performed by: NURSE PRACTITIONER

## 2022-10-10 PROCEDURE — 3008F BODY MASS INDEX DOCD: CPT | Mod: CPTII,95,, | Performed by: NURSE PRACTITIONER

## 2022-10-10 PROCEDURE — 1159F MED LIST DOCD IN RCRD: CPT | Mod: CPTII,95,, | Performed by: NURSE PRACTITIONER

## 2022-10-10 PROCEDURE — 99214 PR OFFICE/OUTPT VISIT, EST, LEVL IV, 30-39 MIN: ICD-10-PCS | Mod: 95,,, | Performed by: NURSE PRACTITIONER

## 2022-10-10 PROCEDURE — 1159F PR MEDICATION LIST DOCUMENTED IN MEDICAL RECORD: ICD-10-PCS | Mod: CPTII,95,, | Performed by: NURSE PRACTITIONER

## 2022-10-10 PROCEDURE — 1160F PR REVIEW ALL MEDS BY PRESCRIBER/CLIN PHARMACIST DOCUMENTED: ICD-10-PCS | Mod: CPTII,95,, | Performed by: NURSE PRACTITIONER

## 2022-10-10 PROCEDURE — 1160F RVW MEDS BY RX/DR IN RCRD: CPT | Mod: CPTII,95,, | Performed by: NURSE PRACTITIONER

## 2022-10-10 PROCEDURE — 3008F PR BODY MASS INDEX (BMI) DOCUMENTED: ICD-10-PCS | Mod: CPTII,95,, | Performed by: NURSE PRACTITIONER

## 2022-10-10 NOTE — PROGRESS NOTES
"Telemedicine Consent  The patient location is: Home  The chief complaint leading to consultation is: left knee pain  Visit type: Face-to-face video  30 minutes of total time spent on the encounter, which includes face-to-face time and non-face-to-face time preparing to see the patient (eg. review of tests), obtaining and/or reviewing separately obtained history, documenting clinical information in the electronic or other health record, independently interpreting results (not separately reported) and communicating results to the patient/family/caregiver or care coordination (not separately reported).   I have reviewed patient's name,  and picture ID if applicable.  I discussed with patient regarding medical services by telemedicine (1) informed of the relationship between the physician and patient and the respective role of any other health care provider with respect to management of the patient (2) session are not recorded and personal health information is protected; and (3) notified that he or she may decline to receive medical services by telemedicine and may withdraw from such care at any time.  Patient consented to consult by telemedicine.     Subjective:   Follow up .  Patient ID: Jeanie Wong is a 37 y.o. female. Non-smoker. Employment HX: , currently unemployed.    Seen [facility] for same DX since n/a.   Chief Complaint: Pain of the Left Knee and Knee Pain (MRI Results)    HPI:    Left aching medial knee pain.   Injury: no known injury  Onset: several years ago fluctuates   Modifying Factors: worse with activity, improves with rest, stiffness after immobilization and improves with less than 30 minutes activity  Associated Symptoms: "popping", swelling and decreased ROM   Activity: normal activity without exercise or sports activity and pain mildly interferes with ADLs   Previous Treatments:  BMI reduction ongoing education, soft knee splint, HEP withTheraBand, RX NSAIDs, and CSI since  last " "injection 6/7/22 with drainage of 40 ml clear yellow fluid with return of effusion within a few weeks  without symptoms relief  PMH: negative for contraindications for NSAID use  Family History: + OA    Note: CSI with drainage of 40 ml fluid with great relief x2-3 weeks then symptoms returned including instability and swelling with mechanical locking. Desires surgical treatment options. MRI results today.  Currently patient with severe effusion limiting ROM.     Current Outpatient Medications:     meloxicam (MOBIC) 15 MG tablet, Take 1 tablet (15 mg total) by mouth once daily., Disp: 30 tablet, Rfl: 0    ferrous sulfate (FEOSOL) 325 mg (65 mg iron) Tab tablet, ferrous sulfate 325 mg (65 mg iron) tablet, Disp: , Rfl:     valACYclovir (VALTREX) 1000 MG tablet, TK 1 T PO Q 8 H, Disp: , Rfl: 0    Review of patient's allergies indicates:   Allergen Reactions    Benadryl allergy decongestant Shortness Of Breath    Diphenhydramine hcl     Sulfa (sulfonamide antibiotics) Swelling       No results found for: HGBA1C   Body mass index is 27.12 kg/m².   Vitals:    10/10/22 1036   Weight: 71.7 kg (158 lb)   Height: 5' 4" (1.626 m)   PainSc:   8      Review of Systems:  A ten-point review of systems was performed and is negative, except as mentioned above   Objective:   N/a telemedicine visit   Assessment:       Imaging: X-ray 4 views of left knee dated 6/7/22 reviewed and independently interpreted by me.  Discussed with patient. Noted no acute, DJD noted.    XR KNEE COMP 4 OR MORE VIEWS LEFT 6/7/22  CLINICAL HISTORY:  Pain in left knee  TECHNIQUE:  Four views of the left knee.  COMPARISON:  04/14/2022  FINDINGS:  No acute fracture.  Appearance of increasing loss of joint space of the medial compartment.  No effusion.  The soft tissues are grossly unremarkable.  Impression:  Increasing loss of joint space in the medial compartment.  No acute fracture.    MRI KNEE WITHOUT CONTRAST LEFT 10/4/22  CLINICAL HISTORY:  37-year-old woman " with left knee pain for approximately 3 months.  TECHNIQUE:  Axial T2 fat sat, sagittal T2 fat sat, sagittal PD, coronal PD fat sat, coronal T1, and thin slice axial T2 fat-sat meniscal non-contrast 1.5 T magnetic resonance imaging was performed through the left knee per routine protocol.  COMPARISON:  None.  Correlation is made with left knee radiographs of 06/07/2022.  FINDINGS:  There is a small joint effusion with no popliteal fossa cyst.  There is prominent marrow edema on both sides of the medial femorotibial articulation.  There is severe full-thickness and near full-thickness cartilage loss along the anterior and central medial femorotibial articulation with mild irregularity along the central weight-bearing medial femoral condyle subchondral bone plate which is slightly collapsed.  The medial meniscus is partially extruded medially with no tear identified.  There is mild medial femorotibial marginal osteophyte formation.  No osteochondral body is identified.  The patellofemoral and lateral femorotibial articular cartilage is normal.  The lateral meniscus is intact and normal.  The ACL and PCL are both normal.  The superficial MCL is mildly bowed medially due to the medial femorotibial degenerative arthrosis and meniscal extrusion.  There is also mild chronic degeneration/scarring along the MCL which is slightly thickened and intermediate in signal.  The lateral and posterolateral corner ligaments and medial and lateral tendons are all normal.  The quadriceps tendon and patellofemoral retinacular complexes are normal and there is no abnormality at the quadriceps fat pad.  There is mild distal patellar tendinosis with no abnormality at Hoffa's fat pad.  There is no muscle edema or atrophy.  The proximal tibiofibular joint is normal.  There is no malalignment or osseous lesion.  Impression:  Small joint effusion and severe medial femorotibial degenerative arthrosis with prominent reactive marrow edema and  slight subchondral collapse at the central weight-bearing medial femoral condyle.  Partial medial extrusion of the medial meniscus without meniscal tear.  Mild distal patellar tendinosis.    EMR Review: completed with noted prior visit 9/28/22 reviewed.    1. Chondral defect of femoral condyle, unspecified laterality    2. Effusion, left knee    3. Arthropathy of left knee    4. BMI 27.0-27.9,adult      Plan:       Ongoing education about DX and treatment recommendations including conservative treatments of daily HEP with TheraBand, BMI reduction goal 5-10% of body weight (120# overall goal), muscle strengthening with use of stationary bike (RPM set at 80 or > with slow progression to goal of 40 minutes 3-4 times per week as tolerated), adequate vit D/C, glucosamine 1500 mg/day and daily acetaminophen 1000 mg 3 times/ day if able to tolerate.    Treatment plan: Moderate-to-severe knee arthritis with subchondral collapse to medial femoral condyle and medial meniscal extrusion with mechanical locking and chronic moderate effusion of left knee Referral to ortho surgeon to discuss possible surgical options s/t failed conservative treatment  Procedure: n/a  RX Medications: continue medications as RX per PCP  RTC: next available appt. with ortho res.     Erica Vaughn FNDARRYL    Timed Billing Note  Total Time Spent with Patient: 30 minutes  Visit Start Time: 1345  10 minutes spent prior to exam reviewing EMR, prior labs and x-rays.  10 minutes spent in exam with patient completing exam, obtaining history, educating on DX and treatment plan.  10 minutes spent after exam completing EHR documentation.   Visit End Time: 1415

## 2022-11-02 ENCOUNTER — OFFICE VISIT (OUTPATIENT)
Dept: ORTHOPEDICS | Facility: CLINIC | Age: 37
End: 2022-11-02
Payer: MEDICAID

## 2022-11-02 VITALS — BODY MASS INDEX: 26.98 KG/M2 | WEIGHT: 158 LBS | HEIGHT: 64 IN

## 2022-11-02 DIAGNOSIS — M23.8X9 CHONDRAL DEFECT OF FEMORAL CONDYLE, UNSPECIFIED LATERALITY: Primary | ICD-10-CM

## 2022-11-02 PROCEDURE — 1159F PR MEDICATION LIST DOCUMENTED IN MEDICAL RECORD: ICD-10-PCS | Mod: CPTII,,, | Performed by: ORTHOPAEDIC SURGERY

## 2022-11-02 PROCEDURE — 99499 UNLISTED E&M SERVICE: CPT | Mod: S$PBB,,, | Performed by: ORTHOPAEDIC SURGERY

## 2022-11-02 PROCEDURE — 99213 OFFICE O/P EST LOW 20 MIN: CPT | Mod: PBBFAC

## 2022-11-02 PROCEDURE — 3008F PR BODY MASS INDEX (BMI) DOCUMENTED: ICD-10-PCS | Mod: CPTII,,, | Performed by: ORTHOPAEDIC SURGERY

## 2022-11-02 PROCEDURE — 3008F BODY MASS INDEX DOCD: CPT | Mod: CPTII,,, | Performed by: ORTHOPAEDIC SURGERY

## 2022-11-02 PROCEDURE — 1159F MED LIST DOCD IN RCRD: CPT | Mod: CPTII,,, | Performed by: ORTHOPAEDIC SURGERY

## 2022-11-02 PROCEDURE — 99499 NO LOS: ICD-10-PCS | Mod: S$PBB,,, | Performed by: ORTHOPAEDIC SURGERY

## 2022-11-02 NOTE — PROGRESS NOTES
ATTENDING ADDENDUM    Jeanie Wong  was evaluated at the time of the encounter with Dr Mcneil PGY5.  HPI, PE and treatment plan was reviewed. Treatment plan was reasonable and necessary. Imaging was reviewed at the time of visit.     I was present during critical or key resident-provided service and procedure portions of the visit.

## 2022-11-02 NOTE — PROGRESS NOTES
[facility] NOTE     Patient be seen in clinic today and evaluated with her left knee pain.  Upon entry into the room patient was complaining of excruciating back, kidney and abdominal pain.  Says that is been painful with urination as well as defecation.  Symptoms have been increasing over the past couple of hours.  She has some subjective feelings of sickness and fevers.    Given the severity of the symptoms.  Recommend that patient should be seen and evaluated in the emergency department in order to rule out any significant pathology.  We will reschedule the patient with regards to her left knee when she is feeling better.  Patient agreement with this treatment plan.  ER called and notified regarding the patient's transfer to the ER.  Patient was mobilized to the ER using a wheelchair.    No charge for this visit    Holland Mcneil MD

## 2022-11-02 NOTE — PROGRESS NOTES
Patient declined going to the ED as recommended per Dr. Chase, states pain in back is better at this time, T-97.8,HR-74, RR-20 B/P 141/85 O2 sat 100%. Patient will call to reschedule ortho appointment. To private car condition stable.

## 2022-11-14 ENCOUNTER — OFFICE VISIT (OUTPATIENT)
Dept: ORTHOPEDICS | Facility: CLINIC | Age: 37
End: 2022-11-14
Payer: MEDICAID

## 2022-11-14 VITALS — BODY MASS INDEX: 26.98 KG/M2 | HEIGHT: 64 IN | WEIGHT: 158 LBS

## 2022-11-14 DIAGNOSIS — M17.12 ARTHRITIS OF LEFT KNEE: Primary | ICD-10-CM

## 2022-11-14 PROCEDURE — 99213 OFFICE O/P EST LOW 20 MIN: CPT | Mod: PBBFAC

## 2022-11-14 PROCEDURE — 3008F BODY MASS INDEX DOCD: CPT | Mod: CPTII,,, | Performed by: ORTHOPAEDIC SURGERY

## 2022-11-14 PROCEDURE — 1159F MED LIST DOCD IN RCRD: CPT | Mod: CPTII,,, | Performed by: ORTHOPAEDIC SURGERY

## 2022-11-14 PROCEDURE — 99214 OFFICE O/P EST MOD 30 MIN: CPT | Mod: S$PBB,,, | Performed by: ORTHOPAEDIC SURGERY

## 2022-11-14 PROCEDURE — 1159F PR MEDICATION LIST DOCUMENTED IN MEDICAL RECORD: ICD-10-PCS | Mod: CPTII,,, | Performed by: ORTHOPAEDIC SURGERY

## 2022-11-14 PROCEDURE — 3008F PR BODY MASS INDEX (BMI) DOCUMENTED: ICD-10-PCS | Mod: CPTII,,, | Performed by: ORTHOPAEDIC SURGERY

## 2022-11-14 PROCEDURE — 99214 PR OFFICE/OUTPT VISIT, EST, LEVL IV, 30-39 MIN: ICD-10-PCS | Mod: S$PBB,,, | Performed by: ORTHOPAEDIC SURGERY

## 2022-11-14 RX ORDER — MELOXICAM 15 MG/1
15 TABLET ORAL DAILY
Qty: 30 TABLET | Refills: 1 | Status: SHIPPED | OUTPATIENT
Start: 2022-11-14

## 2022-11-14 NOTE — PROGRESS NOTES
[facility] Orthopaedic Surgery Clinic Note    In brief, Jeanie Wong is a 37 y.o. female presenting as a referral with left knee pain     Patient's prior visit she was experiencing a kidney stone.  She is extremely grateful that we sent her to the emergency department because her kidney stone was diagnosed and she is being subsequently treated.  She feels much better today.      With regards to her left knee she has persistent pain.  She is undergone some physical therapy as well as repeated aspirations and injections of the left knee.  She gets relief however when the injections wear off she has increased pain in the knee.  Pain is worse with long periods of activity.  No significant instability.  No significant mechanical symptoms.  Has intermittent swelling and severe pain.  States by the afternoon she is unable to walk or perform her duties at her job.  She has no prior surgeries on the left knee.  No associated symptoms of infection.  No other site of pain.      Of note, patient is a  at one of the local high schools.  She has a history of hep C.  She uses nicotine.        PMH:   Past Medical History:   Diagnosis Date    Cancer 2003    cervix    Hepatitis C        PSH:   Past Surgical History:   Procedure Laterality Date    colonoscopy      ESOPHAGOGASTRODUODENOSCOPY      hemorhoidectomy         SH:   Social History     Socioeconomic History    Marital status: Legally    Tobacco Use    Smoking status: Every Day     Packs/day: 0.50     Years: 13.00     Pack years: 6.50     Types: Cigarettes, Vaping with nicotine    Smokeless tobacco: Never   Substance and Sexual Activity    Alcohol use: Yes     Alcohol/week: 2.0 standard drinks     Types: 1 Cans of beer, 1 Glasses of wine per week    Drug use: No    Sexual activity: Yes     Partners: Male     Birth control/protection: None       FH:   Family History   Problem Relation Age of Onset    Cancer Mother     Cancer Maternal Aunt        Allergies:   Review of  patient's allergies indicates:   Allergen Reactions    Benadryl allergy decongestant Shortness Of Breath    Diphenhydramine hcl     Sulfa (sulfonamide antibiotics) Swelling       ROS:  Constitutional- no fever, fatigue, weakness  Eye- no vision loss, eye redness, drainage, or pain  ENMT- no sore throat, ear pain, sinus pain/congestion, nasal congestion/drainage  Respiratory- no cough, wheezing, or shortness of breath  CV- no chest pain, no palpitations, no edema  GI- no N/V/D; no abdominal pain    Physical Exam:  There were no vitals filed for this visit.    General: NAD  Cardio: RRR by peripheral pulse  Pulm: Normal WOB on room air, symmetric chest rise  Abd: Soft, NT/ND    MSK:  Slight effusion left knee compared to contralateral knee   Tenderness medial compartment   Motor intact L2-S1   Active knee range of motion 0-120   No varus or valgus instability   Equivocal Carolann   Negative Stinchfield   Sensation intact to light touch distally  DP palpable    Imaging:   Radiographs and MRI of the left knee independently reviewed demonstrate severe medial compartment arthritis; no significant arthritis in the lateral patellofemoral compartment; no definitive meniscal tear or loose cartilage bodies; some medial meniscal extrusion    Assessment:  37-year-old female with left knee severe medial compartment osteoarthritis    Discussed with the patient that she is in a difficult situation.  She is relatively young to have severe arthritis.  Her arthritis is confined to the medial compartment.  She is failed nonoperative treatment with regards to physical therapy and injections.  At this time arthroscopic procedure on her left knee may not alleviate any of her symptoms and it is a risk of her undergoing surgery.  State that it might be beneficial to discuss with a an arthroplasty surgeon her options with regards to other injections such as hyaluronic acid, potential unicompartmental arthroplasty.  If patient decides that she  would like to undergo left knee arthroscopic surgery given her failed non operative treatment she may undergo this procedure as long as she understands the risk and that this may not provide any relief of her symptoms given that they are largely coming from her underlying arthritis.  Patient understands that.  We will refer her to an arthroplasty surgeon.  She will follow up as needed with her decision for further treatment.    Holland Mcneil MD  [facility] Orthopaedic Surgery  11/14/2022 2:56 PM

## 2022-11-14 NOTE — LETTER
November 14, 2022      [facility]  [address] [place] [id number]  Phone: [phone]       Patient: Jeanie Wong   YOB: 1985  Date of Visit: 11/14/2022    To Whom It May Concern:    Yuri Wong  was at [facility] on 11/14/2022. The patient may return to work/school on 11/15/2022 with no restrictions. If you have any questions or concerns, or if I can be of further assistance, please do not hesitate to contact me.    Sincerely,    Irwin Burks MD

## 2022-11-23 ENCOUNTER — HOSPITAL ENCOUNTER (EMERGENCY)
Facility: HOSPITAL | Age: 37
Discharge: HOME | End: 2022-11-24
Attending: STUDENT IN AN ORGANIZED HEALTH CARE EDUCATION/TRAINING PROGRAM
Payer: MEDICAID

## 2022-11-23 VITALS
RESPIRATION RATE: 20 BRPM | HEIGHT: 64 IN | SYSTOLIC BLOOD PRESSURE: 147 MMHG | BODY MASS INDEX: 27.31 KG/M2 | OXYGEN SATURATION: 98 % | HEART RATE: 96 BPM | TEMPERATURE: 98 F | DIASTOLIC BLOOD PRESSURE: 92 MMHG | WEIGHT: 160 LBS

## 2022-11-23 DIAGNOSIS — L03.119 CELLULITIS OF FOOT: Primary | ICD-10-CM

## 2022-11-23 DIAGNOSIS — M79.673 FOOT PAIN: ICD-10-CM

## 2022-11-23 LAB
ALBUMIN SERPL-MCNC: 3.8 GM/DL (ref 3.5–5)
ALBUMIN/GLOB SERPL: 1.1 RATIO (ref 1.1–2)
ALP SERPL-CCNC: 109 UNIT/L (ref 40–150)
ALT SERPL-CCNC: 40 UNIT/L (ref 0–55)
AST SERPL-CCNC: 49 UNIT/L (ref 5–34)
BILIRUBIN DIRECT+TOT PNL SERPL-MCNC: 0.4 MG/DL
BUN SERPL-MCNC: 14.5 MG/DL (ref 7–18.7)
CALCIUM SERPL-MCNC: 9.9 MG/DL (ref 8.4–10.2)
CHLORIDE SERPL-SCNC: 105 MMOL/L (ref 98–107)
CO2 SERPL-SCNC: 25 MMOL/L (ref 22–29)
CREAT SERPL-MCNC: 0.76 MG/DL (ref 0.55–1.02)
ERYTHROCYTE [DISTWIDTH] IN BLOOD BY AUTOMATED COUNT: 13.1 % (ref 11.5–17)
GFR SERPLBLD CREATININE-BSD FMLA CKD-EPI: >60 MLS/MIN/1.73/M2
GLOBULIN SER-MCNC: 3.4 GM/DL (ref 2.4–3.5)
GLUCOSE SERPL-MCNC: 103 MG/DL (ref 74–100)
HCT VFR BLD AUTO: 41.2 % (ref 37–47)
HGB BLD-MCNC: 13.5 GM/DL (ref 12–16)
IMM GRANULOCYTES # BLD AUTO: 0.02 X10(3)/MCL (ref 0–0.04)
IMM GRANULOCYTES NFR BLD AUTO: 0.2 %
MCH RBC QN AUTO: 29.3 PG (ref 27–31)
MCHC RBC AUTO-ENTMCNC: 32.8 MG/DL (ref 33–36)
MCV RBC AUTO: 89.4 FL (ref 80–94)
NRBC BLD AUTO-RTO: 0 %
PLATELET # BLD AUTO: 267 X10(3)/MCL (ref 130–400)
PMV BLD AUTO: 10 FL (ref 7.4–10.4)
POTASSIUM SERPL-SCNC: 4.5 MMOL/L (ref 3.5–5.1)
PROT SERPL-MCNC: 7.2 GM/DL (ref 6.4–8.3)
RBC # BLD AUTO: 4.61 X10(6)/MCL (ref 4.2–5.4)
SODIUM SERPL-SCNC: 139 MMOL/L (ref 136–145)
WBC # SPEC AUTO: 8.2 X10(3)/MCL (ref 4.5–11.5)

## 2022-11-23 PROCEDURE — 85027 COMPLETE CBC AUTOMATED: CPT | Performed by: STUDENT IN AN ORGANIZED HEALTH CARE EDUCATION/TRAINING PROGRAM

## 2022-11-23 PROCEDURE — 80053 COMPREHEN METABOLIC PANEL: CPT | Performed by: STUDENT IN AN ORGANIZED HEALTH CARE EDUCATION/TRAINING PROGRAM

## 2022-11-23 PROCEDURE — 85025 COMPLETE CBC W/AUTO DIFF WBC: CPT | Performed by: STUDENT IN AN ORGANIZED HEALTH CARE EDUCATION/TRAINING PROGRAM

## 2022-11-23 PROCEDURE — 99284 EMERGENCY DEPT VISIT MOD MDM: CPT | Mod: 25

## 2022-11-23 RX ORDER — CLINDAMYCIN HYDROCHLORIDE 150 MG/1
450 CAPSULE ORAL 3 TIMES DAILY
Qty: 63 CAPSULE | Refills: 0 | Status: SHIPPED | OUTPATIENT
Start: 2022-11-23 | End: 2022-11-30

## 2022-11-23 RX ORDER — CLINDAMYCIN HYDROCHLORIDE 150 MG/1
450 CAPSULE ORAL 3 TIMES DAILY
Qty: 63 CAPSULE | Refills: 0 | Status: SHIPPED | OUTPATIENT
Start: 2022-11-23 | End: 2022-11-23 | Stop reason: SDUPTHER

## 2022-11-24 LAB
ABS NEUT (OLG): 3.85 X10(3)/MCL (ref 2.1–9.2)
BASOPHILS NFR BLD MANUAL: 0.08 X10(3)/MCL (ref 0–0.2)
BASOPHILS NFR BLD MANUAL: 1 %
EOSINOPHIL NFR BLD MANUAL: 1.15 X10(3)/MCL (ref 0–0.9)
EOSINOPHIL NFR BLD MANUAL: 14 %
INSTRUMENT WBC (OLG): 8.2 X10(3)/MCL
LYMPHOCYTES NFR BLD MANUAL: 2.95 X10(3)/MCL
LYMPHOCYTES NFR BLD MANUAL: 36 %
MONOCYTES NFR BLD MANUAL: 0.25 X10(3)/MCL (ref 0.1–1.3)
MONOCYTES NFR BLD MANUAL: 3 %
NEUTROPHILS NFR BLD MANUAL: 47 %
PLATELET # BLD EST: NORMAL 10*3/UL
RBC MORPH BLD: NORMAL

## 2022-11-24 NOTE — ED PROVIDER NOTES
"Encounter Date: 11/23/2022    SCRIBE #1 NOTE: I, Germania Sethi, am scribing for, and in the presence of,  Cliff Bains IV, MD. I have scribed the following portions of the note - Other sections scribed: HPI, ROS, Physical exam.     History     Chief Complaint   Patient presents with    Foot Pain     Pt arrives c/o R foot pain onset 1 week ago. Pt states she stands for long hours at work with closed toed shoes. Pt reports she first noticed "crack" under R foot 5th toe w/redness that has now moved to dorsum of foot. Denies numbness, tingling, fevers. No relief with athlete foot spray, epsom salt soak at home.        This is a 37 y.o. female, with a PMHx of HepC and cervical cancer, who presents with complaint of right foot pain with onset 1 week ago. Patient reports that she is a  and stands on her feet for long periods of time. She adds that she noticed a "crack" under her right foot and some redness to the top of her right foot. Patient notes that she had no relief with athlete foot spray and epsom salt soak at home. Patient reports that yesterday she had fluid retention in her right foot and ankle.      The history is provided by the patient.   Foot Pain  This is a new problem. The current episode started more than 2 days ago. The problem occurs constantly. The problem has not changed since onset.Pertinent negatives include no chest pain, no abdominal pain and no shortness of breath. Nothing aggravates the symptoms. Nothing relieves the symptoms.   Review of patient's allergies indicates:   Allergen Reactions    Benadryl allergy decongestant Shortness Of Breath    Diphenhydramine hcl     Sulfa (sulfonamide antibiotics) Swelling     Past Medical History:   Diagnosis Date    Cancer 2003    cervix    Hepatitis C      Past Surgical History:   Procedure Laterality Date    colonoscopy      ESOPHAGOGASTRODUODENOSCOPY      hemorhoidectomy       Family History   Problem Relation Age of Onset    Cancer Mother     " Cancer Maternal Aunt      Social History     Tobacco Use    Smoking status: Every Day     Packs/day: 0.50     Years: 13.00     Pack years: 6.50     Types: Cigarettes, Vaping with nicotine    Smokeless tobacco: Never   Substance Use Topics    Alcohol use: Not Currently     Alcohol/week: 2.0 standard drinks     Types: 1 Glasses of wine, 1 Cans of beer per week    Drug use: Not Currently     Comment: 1.5 yrs clean     Review of Systems   Constitutional:  Negative for chills and fever.   HENT:  Negative for congestion, rhinorrhea and sore throat.    Eyes:  Negative for visual disturbance.   Respiratory:  Negative for cough and shortness of breath.    Cardiovascular:  Negative for chest pain and leg swelling.   Gastrointestinal:  Negative for abdominal pain, nausea and vomiting.   Genitourinary:  Negative for dysuria, hematuria, vaginal bleeding and vaginal discharge.   Musculoskeletal:  Negative for joint swelling.        + Foot pain   Skin:  Positive for color change (Redness) and wound. Negative for rash.   Neurological:  Negative for weakness.   Psychiatric/Behavioral:  Negative for confusion.      Physical Exam     Initial Vitals [11/23/22 2207]   BP Pulse Resp Temp SpO2   (!) 147/92 96 20 98.1 °F (36.7 °C) 98 %      MAP       --         Physical Exam    Nursing note and vitals reviewed.  Constitutional: She is not diaphoretic. No distress.   HENT:   Head: Normocephalic and atraumatic.   Eyes: EOM are normal. Pupils are equal, round, and reactive to light.   Neck: Neck supple.   Normal range of motion.  Cardiovascular:  Normal rate and regular rhythm.           No murmur heard.  Pulmonary/Chest: Breath sounds normal. No respiratory distress. She has no wheezes. She has no rales.   Abdominal: Abdomen is soft. She exhibits no distension. There is no abdominal tenderness.   Musculoskeletal:         General: Tenderness present. Normal range of motion.      Cervical back: Normal range of motion and neck supple.      Neurological: She is alert and oriented to person, place, and time. She has normal strength.   Skin: Skin is warm.   Blister/scab between 4th and 5th toe of right foot with small area of surrounding erythema, warmth, ttp c/w cellulitis    Psychiatric: She has a normal mood and affect.       ED Course   Procedures  Labs Reviewed   COMPREHENSIVE METABOLIC PANEL - Abnormal; Notable for the following components:       Result Value    Glucose Level 103 (*)     Aspartate Aminotransferase 49 (*)     All other components within normal limits   CBC WITH DIFFERENTIAL - Abnormal; Notable for the following components:    MCHC 32.8 (*)     All other components within normal limits   MANUAL DIFFERENTIAL - Abnormal; Notable for the following components:    Abs Eos  1.148 (*)     All other components within normal limits   CBC W/ AUTO DIFFERENTIAL    Narrative:     The following orders were created for panel order CBC auto differential.  Procedure                               Abnormality         Status                     ---------                               -----------         ------                     CBC with Differential[[id number]]        Abnormal            Final result               Manual Differential[[id number]]          Abnormal            Final result                 Please view results for these tests on the individual orders.          Imaging Results              X-Ray Foot Complete Right (In process)                   X-Rays:   Independently Interpreted Readings:   Other Readings:  XR foot -no foreign body or fracture   Medications - No data to display  Medical Decision Making:   History:   Old Medical Records: I decided to obtain old medical records.  Independently Interpreted Test(s):   I have ordered and independently interpreted X-rays - see prior notes.  Clinical Tests:   Lab Tests: Ordered and Reviewed  Radiological Study: Ordered and Reviewed        Scribe Attestation:   Scribe #1: I performed the above  scribed service and the documentation accurately describes the services I performed. I attest to the accuracy of the note.    Attending Attestation:           Physician Attestation for Scribe:  Physician Attestation Statement for Scribe #1: I, Cliff Bains IV, MD, reviewed documentation, as scribed by Germania Sethi in my presence, and it is both accurate and complete.                        Clinical Impression:   Final diagnoses:  [M79.673] Foot pain  [L03.119] Cellulitis of foot (Primary)        ED Disposition Condition    Discharge Stable          ED Prescriptions       Medication Sig Dispense Start Date End Date Auth. Provider    clindamycin (CLEOCIN) 150 MG capsule  (Status: Discontinued) Take 3 capsules (450 mg total) by mouth 3 (three) times daily. for 7 days 63 capsule 11/23/2022 11/23/2022 Cliff Bains IV, MD    clindamycin (CLEOCIN) 150 MG capsule Take 3 capsules (450 mg total) by mouth 3 (three) times daily. for 7 days 63 capsule 11/23/2022 11/30/2022 Cliff Bains IV, MD          Follow-up Information       Follow up With Specialties Details Why Contact Info    [facility] Emergency Medicine Go to  If symptoms worsen [address] [facility] [address] [phone]    PCP            ICliff MD personally performed the history, PE, MDM, and procedures as documented above and agree with the scribe's documentation.        Cliff Bains IV, MD  11/24/22 8054

## 2023-05-03 ENCOUNTER — HOSPITAL ENCOUNTER (OUTPATIENT)
Facility: HOSPITAL | Age: 38
Discharge: HOME | End: 2023-05-06
Attending: STUDENT IN AN ORGANIZED HEALTH CARE EDUCATION/TRAINING PROGRAM | Admitting: INTERNAL MEDICINE
Payer: MEDICAID

## 2023-05-03 DIAGNOSIS — M25.562 LEFT KNEE PAIN: ICD-10-CM

## 2023-05-03 DIAGNOSIS — M79.89 LEFT LEG SWELLING: ICD-10-CM

## 2023-05-03 DIAGNOSIS — M25.562 ACUTE PAIN OF LEFT KNEE: Primary | ICD-10-CM

## 2023-05-03 DIAGNOSIS — L03.90 CELLULITIS: ICD-10-CM

## 2023-05-03 LAB
ABS NEUT CALC (OHS): 4.46 X10(3)/MCL (ref 2.1–9.2)
ALBUMIN SERPL-MCNC: 3.5 G/DL (ref 3.5–5)
ALBUMIN/GLOB SERPL: 0.9 RATIO (ref 1.1–2)
ALP SERPL-CCNC: 115 UNIT/L (ref 40–150)
ALT SERPL-CCNC: 20 UNIT/L (ref 0–55)
AMPHET UR QL SCN: POSITIVE
APPEARANCE UR: ABNORMAL
APTT PPP: 24.1 SECONDS (ref 23.4–33.9)
AST SERPL-CCNC: 39 UNIT/L (ref 5–34)
B-HCG SERPL QL: NEGATIVE
BARBITURATE SCN PRESENT UR: NEGATIVE
BENZODIAZ UR QL SCN: POSITIVE
BILIRUB UR QL STRIP.AUTO: NEGATIVE MG/DL
BILIRUBIN DIRECT+TOT PNL SERPL-MCNC: 0.4 MG/DL
BUN SERPL-MCNC: 16.4 MG/DL (ref 7–18.7)
CALCIUM SERPL-MCNC: 9.5 MG/DL (ref 8.4–10.2)
CANNABINOIDS UR QL SCN: POSITIVE
CHLORIDE SERPL-SCNC: 106 MMOL/L (ref 98–107)
CO2 SERPL-SCNC: 23 MMOL/L (ref 22–29)
COCAINE UR QL SCN: NEGATIVE
COLOR UR AUTO: ABNORMAL
CREAT SERPL-MCNC: 0.84 MG/DL (ref 0.55–1.02)
EOSINOPHIL NFR BLD MANUAL: 0.93 X10(3)/MCL (ref 0–0.9)
EOSINOPHIL NFR BLD MANUAL: 11 % (ref 0–8)
ERYTHROCYTE [DISTWIDTH] IN BLOOD BY AUTOMATED COUNT: 13.4 % (ref 11.5–17)
FENTANYL UR QL SCN: POSITIVE
GFR SERPLBLD CREATININE-BSD FMLA CKD-EPI: >60 MLS/MIN/1.73/M2
GLOBULIN SER-MCNC: 3.7 GM/DL (ref 2.4–3.5)
GLUCOSE SERPL-MCNC: 91 MG/DL (ref 74–100)
GLUCOSE UR QL STRIP.AUTO: NEGATIVE MG/DL
HCT VFR BLD AUTO: 35.5 % (ref 37–47)
HGB BLD-MCNC: 11.4 G/DL (ref 12–16)
INR BLD: 0.9 (ref 2–3)
KETONES UR QL STRIP.AUTO: NEGATIVE MG/DL
LEUKOCYTE ESTERASE UR QL STRIP.AUTO: NEGATIVE UNIT/L
LYMPHOCYTES NFR BLD MANUAL: 2.69 X10(3)/MCL
LYMPHOCYTES NFR BLD MANUAL: 32 % (ref 13–40)
MCH RBC QN AUTO: 29.2 PG (ref 27–31)
MCHC RBC AUTO-ENTMCNC: 32.1 G/DL (ref 33–36)
MCV RBC AUTO: 90.8 FL (ref 80–94)
MDMA UR QL SCN: NEGATIVE
MONOCYTES NFR BLD MANUAL: 0.34 X10(3)/MCL (ref 0.1–1.3)
MONOCYTES NFR BLD MANUAL: 4 % (ref 2–11)
NEUTROPHILS NFR BLD MANUAL: 53 % (ref 47–80)
NITRITE UR QL STRIP.AUTO: NEGATIVE
NRBC BLD AUTO-RTO: 0 %
OPIATES UR QL SCN: POSITIVE
PCP UR QL: NEGATIVE
PH UR STRIP.AUTO: 6.5 [PH]
PH UR: 6.5 [PH] (ref 3–11)
PLATELET # BLD AUTO: 318 X10(3)/MCL (ref 130–400)
PLATELET # BLD EST: ADEQUATE 10*3/UL
PMV BLD AUTO: 10.3 FL (ref 7.4–10.4)
POTASSIUM SERPL-SCNC: 4.7 MMOL/L (ref 3.5–5.1)
PROT SERPL-MCNC: 7.2 GM/DL (ref 6.4–8.3)
PROT UR QL STRIP.AUTO: NEGATIVE MG/DL
PROTHROMBIN TIME: 12.1 SECONDS (ref 11.7–14.5)
RBC # BLD AUTO: 3.91 X10(6)/MCL (ref 4.2–5.4)
RBC MORPH BLD: NORMAL
RBC UR QL AUTO: NEGATIVE UNIT/L
SODIUM SERPL-SCNC: 139 MMOL/L (ref 136–145)
SP GR UR STRIP.AUTO: 1.02
UROBILINOGEN UR STRIP-ACNC: 2 MG/DL
WBC # SPEC AUTO: 8.41 X10(3)/MCL (ref 4.5–11.5)

## 2023-05-03 PROCEDURE — 81003 URINALYSIS AUTO W/O SCOPE: CPT | Mod: 59 | Performed by: STUDENT IN AN ORGANIZED HEALTH CARE EDUCATION/TRAINING PROGRAM

## 2023-05-03 PROCEDURE — 63600175 PHARM REV CODE 636 W HCPCS: Performed by: NURSE PRACTITIONER

## 2023-05-03 PROCEDURE — 85730 THROMBOPLASTIN TIME PARTIAL: CPT | Performed by: STUDENT IN AN ORGANIZED HEALTH CARE EDUCATION/TRAINING PROGRAM

## 2023-05-03 PROCEDURE — 99285 EMERGENCY DEPT VISIT HI MDM: CPT | Mod: 25

## 2023-05-03 PROCEDURE — 85027 COMPLETE CBC AUTOMATED: CPT | Performed by: STUDENT IN AN ORGANIZED HEALTH CARE EDUCATION/TRAINING PROGRAM

## 2023-05-03 PROCEDURE — G0378 HOSPITAL OBSERVATION PER HR: HCPCS

## 2023-05-03 PROCEDURE — 96375 TX/PRO/DX INJ NEW DRUG ADDON: CPT

## 2023-05-03 PROCEDURE — 87040 BLOOD CULTURE FOR BACTERIA: CPT | Performed by: NURSE PRACTITIONER

## 2023-05-03 PROCEDURE — 11000001 HC ACUTE MED/SURG PRIVATE ROOM

## 2023-05-03 PROCEDURE — 85025 COMPLETE CBC W/AUTO DIFF WBC: CPT | Performed by: STUDENT IN AN ORGANIZED HEALTH CARE EDUCATION/TRAINING PROGRAM

## 2023-05-03 PROCEDURE — 96374 THER/PROPH/DIAG INJ IV PUSH: CPT

## 2023-05-03 PROCEDURE — 85610 PROTHROMBIN TIME: CPT | Performed by: STUDENT IN AN ORGANIZED HEALTH CARE EDUCATION/TRAINING PROGRAM

## 2023-05-03 PROCEDURE — 81025 URINE PREGNANCY TEST: CPT | Performed by: STUDENT IN AN ORGANIZED HEALTH CARE EDUCATION/TRAINING PROGRAM

## 2023-05-03 PROCEDURE — 80053 COMPREHEN METABOLIC PANEL: CPT | Performed by: STUDENT IN AN ORGANIZED HEALTH CARE EDUCATION/TRAINING PROGRAM

## 2023-05-03 PROCEDURE — 80307 DRUG TEST PRSMV CHEM ANLYZR: CPT | Performed by: STUDENT IN AN ORGANIZED HEALTH CARE EDUCATION/TRAINING PROGRAM

## 2023-05-03 RX ORDER — ONDANSETRON 2 MG/ML
4 INJECTION INTRAMUSCULAR; INTRAVENOUS
Status: COMPLETED | OUTPATIENT
Start: 2023-05-03 | End: 2023-05-03

## 2023-05-03 RX ORDER — NAPROXEN SODIUM 220 MG/1
81 TABLET, FILM COATED ORAL DAILY
COMMUNITY

## 2023-05-03 RX ORDER — MORPHINE SULFATE 4 MG/ML
4 INJECTION, SOLUTION INTRAMUSCULAR; INTRAVENOUS
Status: COMPLETED | OUTPATIENT
Start: 2023-05-03 | End: 2023-05-03

## 2023-05-03 RX ADMIN — ONDANSETRON 4 MG: 2 INJECTION INTRAMUSCULAR; INTRAVENOUS at 10:05

## 2023-05-03 RX ADMIN — MORPHINE SULFATE 4 MG: 4 INJECTION INTRAVENOUS at 10:05

## 2023-05-03 NOTE — Clinical Note
Diagnosis: Cellulitis [891182]   Admitting Provider:: CORDELL MORAN [[id number]]   Future Attending Provider: CORDELL MORAN [[id number]]   Reason for IP Medical Treatment  (Clinical interventions that can only be accomplished in the IP setting? ) :: Cellulitis   I certify that Inpatient services for greater than or equal to 2 midnights are medically necessary:: Yes   Plans for Post-Acute care--if anticipated (pick the single best option):: A. No post acute care anticipated at this time

## 2023-05-03 NOTE — ED PROVIDER NOTES
Encounter Date: 5/3/2023       History     Chief Complaint   Patient presents with    Leg Pain     Pt relates that she had a total left knee replacement on 4/20/23 by Dr Pereira in [place] and is in an active recovery program whose staff encouraged pt to be evaluated due to worsening of color with redness, pain, and swelling from knee towards ankle with suture site covered with tape pta and compression stocking     HPI    37-year-old female with a past medical history of polysubstance abuse-IV drug use, chronic arthritis, presents emergency department for left leg/knee pain.  Patient underwent a left total knee replacement on 04/20/2023 by Dr. Charly Gilliam.  Patient states that over last few days she started noticing more swelling to the lower leg as well as redness around the knee.  Patient states that her pain is worsening.  She is currently on methadone and takes it daily from the clinic.  States that she is taking Tylenol as needed for pain but is not working.  She states she is out of meloxicam.  No fevers.    Review of patient's allergies indicates:   Allergen Reactions    Benadryl allergy decongestant Shortness Of Breath    Diphenhydramine hcl     Sulfa (sulfonamide antibiotics) Swelling     Past Medical History:   Diagnosis Date    Cancer 2003    cervix    Hepatitis C      Past Surgical History:   Procedure Laterality Date    colonoscopy      ESOPHAGOGASTRODUODENOSCOPY      hemorhoidectomy       Family History   Problem Relation Age of Onset    Cancer Mother     Cancer Maternal Aunt      Social History     Tobacco Use    Smoking status: Every Day     Packs/day: 0.50     Years: 13.00     Pack years: 6.50     Types: Cigarettes, Vaping with nicotine    Smokeless tobacco: Never   Substance Use Topics    Alcohol use: Not Currently     Alcohol/week: 2.0 standard drinks     Types: 1 Glasses of wine, 1 Cans of beer per week    Drug use: Not Currently     Comment: 1.5 yrs clean     Review of Systems    Constitutional:  Negative for fever.   Respiratory:  Negative for cough and shortness of breath.    Cardiovascular:  Positive for leg swelling. Negative for chest pain.   Gastrointestinal:  Negative for abdominal pain.   Skin:  Positive for color change.   All other systems reviewed and are negative.    Physical Exam     Initial Vitals [05/03/23 1650]   BP Pulse Resp Temp SpO2   (!) 143/97 103 20 98.8 °F (37.1 °C) 98 %      MAP       --         Physical Exam    Nursing note and vitals reviewed.  Constitutional: She appears well-developed and well-nourished. No distress.   Cardiovascular:  Normal rate and regular rhythm.           Pulmonary/Chest: Breath sounds normal.   Abdominal: Abdomen is soft. There is no abdominal tenderness. There is no rebound and no guarding.   Musculoskeletal:         General: Tenderness (generalized tenderness to the left lower leg with 2+ pitting edema.  Cellulitic changes around the left knee.  Midline surgical incision to the left anterior knee covered with a bandage that is clean dry and intact) and edema present.     Neurological: She is alert and oriented to person, place, and time.   Skin: Skin is warm. Capillary refill takes less than 2 seconds. There is erythema.   Psychiatric: She has a normal mood and affect. Thought content normal.       ED Course   Procedures  Labs Reviewed   APTT   CBC W/ AUTO DIFFERENTIAL    Narrative:     The following orders were created for panel order CBC auto differential.  Procedure                               Abnormality         Status                     ---------                               -----------         ------                     CBC with Differential[[id number]]                                                         Please view results for these tests on the individual orders.   COMPREHENSIVE METABOLIC PANEL   PROTIME-INR   URINALYSIS, REFLEX TO URINE CULTURE   PREGNANCY TEST, URINE RAPID   CBC WITH DIFFERENTIAL          Imaging  Results              X-Ray Knee 3 View Left (In process)                      Medications - No data to display  Medical Decision Making:   Differential Diagnosis:   Cellulitis, postop infection, DVT, postop pain           ED Course as of 05/03/23 1732   Wed May 03, 2023   1731 [place]. [place] [facility] [place] patient for transfer for ultrasound to rule out DVT. [BS]      ED Course User Index  [BS] Jim Stevens MD                 Clinical Impression:   Final diagnoses:  [M25.562] Left knee pain  [M79.89] Left leg swelling        ED Disposition Condition    Transfer to Another Facility Stable                Jim Stevens MD  05/03/23 1732

## 2023-05-03 NOTE — ED TRIAGE NOTES
Pt relates that she had a total left knee replacement on 4/20/23 by Dr Pereira in [place] and is in an active recovery program whose staff encouraged pt to be evaluated due to worsening of color with redness, pain, and swelling from knee towards ankle with suture site covered with tape pta and compression stocking

## 2023-05-04 PROBLEM — L03.90 CELLULITIS: Status: ACTIVE | Noted: 2023-05-04

## 2023-05-04 PROBLEM — M25.562 ACUTE PAIN OF LEFT KNEE: Status: ACTIVE | Noted: 2023-05-04

## 2023-05-04 LAB
ALBUMIN SERPL-MCNC: 3.5 G/DL (ref 3.5–5)
ALBUMIN/GLOB SERPL: 1.1 RATIO (ref 1.1–2)
ALP SERPL-CCNC: 127 UNIT/L (ref 40–150)
ALT SERPL-CCNC: 18 UNIT/L (ref 0–55)
AST SERPL-CCNC: 36 UNIT/L (ref 5–34)
BASOPHILS # BLD AUTO: 0.05 X10(3)/MCL
BASOPHILS NFR BLD AUTO: 0.8 %
BILIRUBIN DIRECT+TOT PNL SERPL-MCNC: 0.5 MG/DL
BUN SERPL-MCNC: 13 MG/DL (ref 7–18.7)
CALCIUM SERPL-MCNC: 9.6 MG/DL (ref 8.4–10.2)
CHLORIDE SERPL-SCNC: 107 MMOL/L (ref 98–107)
CO2 SERPL-SCNC: 25 MMOL/L (ref 22–29)
CREAT SERPL-MCNC: 0.82 MG/DL (ref 0.55–1.02)
CRP SERPL-MCNC: 11.5 MG/L
EOSINOPHIL # BLD AUTO: 0.9 X10(3)/MCL (ref 0–0.9)
EOSINOPHIL NFR BLD AUTO: 15 %
ERYTHROCYTE [DISTWIDTH] IN BLOOD BY AUTOMATED COUNT: 13.7 % (ref 11.5–17)
ERYTHROCYTE [SEDIMENTATION RATE] IN BLOOD: 10 MM/HR (ref 0–20)
GFR SERPLBLD CREATININE-BSD FMLA CKD-EPI: >60 MLS/MIN/1.73/M2
GLOBULIN SER-MCNC: 3.3 GM/DL (ref 2.4–3.5)
GLUCOSE SERPL-MCNC: 90 MG/DL (ref 74–100)
HCT VFR BLD AUTO: 37.7 % (ref 37–47)
HGB BLD-MCNC: 12.5 G/DL (ref 12–16)
IMM GRANULOCYTES # BLD AUTO: 0.03 X10(3)/MCL (ref 0–0.04)
IMM GRANULOCYTES NFR BLD AUTO: 0.5 %
LYMPHOCYTES # BLD AUTO: 2.33 X10(3)/MCL (ref 0.6–4.6)
LYMPHOCYTES NFR BLD AUTO: 38.8 %
MCH RBC QN AUTO: 29.6 PG (ref 27–31)
MCHC RBC AUTO-ENTMCNC: 33.2 G/DL (ref 33–36)
MCV RBC AUTO: 89.1 FL (ref 80–94)
MONOCYTES # BLD AUTO: 0.46 X10(3)/MCL (ref 0.1–1.3)
MONOCYTES NFR BLD AUTO: 7.7 %
NEUTROPHILS # BLD AUTO: 2.23 X10(3)/MCL (ref 2.1–9.2)
NEUTROPHILS NFR BLD AUTO: 37.2 %
NRBC BLD AUTO-RTO: 0 %
PLATELET # BLD AUTO: 256 X10(3)/MCL (ref 130–400)
PMV BLD AUTO: 11.2 FL (ref 7.4–10.4)
POTASSIUM SERPL-SCNC: 4.3 MMOL/L (ref 3.5–5.1)
PROT SERPL-MCNC: 6.8 GM/DL (ref 6.4–8.3)
RBC # BLD AUTO: 4.23 X10(6)/MCL (ref 4.2–5.4)
SODIUM SERPL-SCNC: 141 MMOL/L (ref 136–145)
WBC # SPEC AUTO: 6 X10(3)/MCL (ref 4.5–11.5)

## 2023-05-04 PROCEDURE — 25000003 PHARM REV CODE 250: Performed by: INTERNAL MEDICINE

## 2023-05-04 PROCEDURE — 63600175 PHARM REV CODE 636 W HCPCS: Performed by: STUDENT IN AN ORGANIZED HEALTH CARE EDUCATION/TRAINING PROGRAM

## 2023-05-04 PROCEDURE — 36573 INSJ PICC RS&I 5 YR+: CPT

## 2023-05-04 PROCEDURE — G0378 HOSPITAL OBSERVATION PER HR: HCPCS

## 2023-05-04 PROCEDURE — 96366 THER/PROPH/DIAG IV INF ADDON: CPT

## 2023-05-04 PROCEDURE — 85651 RBC SED RATE NONAUTOMATED: CPT | Performed by: NURSE PRACTITIONER

## 2023-05-04 PROCEDURE — 36410 VNPNXR 3YR/> PHY/QHP DX/THER: CPT

## 2023-05-04 PROCEDURE — A4216 STERILE WATER/SALINE, 10 ML: HCPCS | Performed by: INTERNAL MEDICINE

## 2023-05-04 PROCEDURE — 25000003 PHARM REV CODE 250: Performed by: STUDENT IN AN ORGANIZED HEALTH CARE EDUCATION/TRAINING PROGRAM

## 2023-05-04 PROCEDURE — 76937 US GUIDE VASCULAR ACCESS: CPT

## 2023-05-04 PROCEDURE — C1751 CATH, INF, PER/CENT/MIDLINE: HCPCS

## 2023-05-04 PROCEDURE — 86140 C-REACTIVE PROTEIN: CPT | Performed by: NURSE PRACTITIONER

## 2023-05-04 PROCEDURE — 96365 THER/PROPH/DIAG IV INF INIT: CPT

## 2023-05-04 PROCEDURE — 80053 COMPREHEN METABOLIC PANEL: CPT | Performed by: INTERNAL MEDICINE

## 2023-05-04 PROCEDURE — 85025 COMPLETE CBC W/AUTO DIFF WBC: CPT | Performed by: INTERNAL MEDICINE

## 2023-05-04 PROCEDURE — 87040 BLOOD CULTURE FOR BACTERIA: CPT | Performed by: NURSE PRACTITIONER

## 2023-05-04 PROCEDURE — 63600175 PHARM REV CODE 636 W HCPCS: Performed by: INTERNAL MEDICINE

## 2023-05-04 PROCEDURE — 99223 1ST HOSP IP/OBS HIGH 75: CPT | Mod: ,,, | Performed by: ORTHOPAEDIC SURGERY

## 2023-05-04 PROCEDURE — 99223 PR INITIAL HOSPITAL CARE,LEVL III: ICD-10-PCS | Mod: ,,, | Performed by: ORTHOPAEDIC SURGERY

## 2023-05-04 PROCEDURE — 96367 TX/PROPH/DG ADDL SEQ IV INF: CPT | Mod: 59

## 2023-05-04 PROCEDURE — 21400001 HC TELEMETRY ROOM

## 2023-05-04 RX ORDER — OXYCODONE HYDROCHLORIDE 5 MG/1
5 TABLET ORAL EVERY 6 HOURS PRN
Status: DISCONTINUED | OUTPATIENT
Start: 2023-05-04 | End: 2023-05-05

## 2023-05-04 RX ORDER — SODIUM CHLORIDE 0.9 % (FLUSH) 0.9 %
10 SYRINGE (ML) INJECTION EVERY 6 HOURS
Status: DISCONTINUED | OUTPATIENT
Start: 2023-05-04 | End: 2023-05-06 | Stop reason: HOSPADM

## 2023-05-04 RX ORDER — NAPROXEN SODIUM 220 MG/1
81 TABLET, FILM COATED ORAL DAILY
Status: DISCONTINUED | OUTPATIENT
Start: 2023-05-05 | End: 2023-05-06 | Stop reason: HOSPADM

## 2023-05-04 RX ORDER — TALC
6 POWDER (GRAM) TOPICAL NIGHTLY PRN
Status: DISCONTINUED | OUTPATIENT
Start: 2023-05-04 | End: 2023-05-06 | Stop reason: HOSPADM

## 2023-05-04 RX ORDER — SODIUM CHLORIDE 0.9 % (FLUSH) 0.9 %
10 SYRINGE (ML) INJECTION
Status: DISCONTINUED | OUTPATIENT
Start: 2023-05-04 | End: 2023-05-06 | Stop reason: HOSPADM

## 2023-05-04 RX ORDER — SODIUM CHLORIDE 0.9 % (FLUSH) 0.9 %
10 SYRINGE (ML) INJECTION
Status: DISCONTINUED | OUTPATIENT
Start: 2023-05-04 | End: 2023-05-05 | Stop reason: SDUPTHER

## 2023-05-04 RX ORDER — ACETAMINOPHEN 325 MG/1
650 TABLET ORAL EVERY 8 HOURS PRN
Status: DISCONTINUED | OUTPATIENT
Start: 2023-05-04 | End: 2023-05-06 | Stop reason: HOSPADM

## 2023-05-04 RX ADMIN — PIPERACILLIN AND TAZOBACTAM 4.5 G: 4; .5 INJECTION, POWDER, LYOPHILIZED, FOR SOLUTION INTRAVENOUS; PARENTERAL at 06:05

## 2023-05-04 RX ADMIN — VANCOMYCIN HYDROCHLORIDE 2000 MG: 1 INJECTION, POWDER, LYOPHILIZED, FOR SOLUTION INTRAVENOUS at 02:05

## 2023-05-04 RX ADMIN — OXYCODONE HYDROCHLORIDE 5 MG: 5 TABLET ORAL at 10:05

## 2023-05-04 RX ADMIN — OXYCODONE HYDROCHLORIDE 5 MG: 5 TABLET ORAL at 09:05

## 2023-05-04 RX ADMIN — PIPERACILLIN AND TAZOBACTAM 4.5 G: 4; .5 INJECTION, POWDER, LYOPHILIZED, FOR SOLUTION INTRAVENOUS; PARENTERAL at 09:05

## 2023-05-04 RX ADMIN — OXYCODONE HYDROCHLORIDE 5 MG: 5 TABLET ORAL at 03:05

## 2023-05-04 RX ADMIN — VANCOMYCIN HYDROCHLORIDE 1250 MG: 1.25 INJECTION, POWDER, LYOPHILIZED, FOR SOLUTION INTRAVENOUS at 01:05

## 2023-05-04 RX ADMIN — OXYCODONE HYDROCHLORIDE 5 MG: 5 TABLET ORAL at 04:05

## 2023-05-04 RX ADMIN — PIPERACILLIN AND TAZOBACTAM 4.5 G: 4; .5 INJECTION, POWDER, LYOPHILIZED, FOR SOLUTION INTRAVENOUS; PARENTERAL at 01:05

## 2023-05-04 RX ADMIN — Medication 10 ML: at 06:05

## 2023-05-04 RX ADMIN — Medication 6 MG: at 10:05

## 2023-05-04 NOTE — H&P
[facility] Hospital Medicine History & Physical Examination       Patient Name: Jeanie Wong  MRN: [id number]  Patient Class: IP- Inpatient   Admission Date: 5/3/2023  4:56 PM  Length of Stay: 1  Admitting Service: Hospital Medicine   Attending Physician: Brad Castaneda MD   Primary Care Provider: Primary Doctor No  History source: EMR, patient and/or patient's family    CHIEF COMPLAINT   Leg Pain (Pt relates that she had a total left knee replacement on 4/20/23 by Dr Pereira in [place] and is in an active recovery program whose staff encouraged pt to be evaluated due to worsening of color with redness, pain, and swelling from knee towards ankle with suture site covered with tape pta and compression stocking)    HISTORY OF PRESENT ILLNESS:   Patient is a 37-year-old female with a history of polysubstance abuse for which she is in a rehab program and is followed at the methadone clinic and [facility], who had degenerative joint disease of her left knee and underwent a total left knee replacement 04/20/2023 in [place] by a Dr. Pereira.  She is not followed up since surgery but is due to do so on the 12th however she was urged to come to the ER by her rehab program due to worsening erythema and swelling of her left lower extremity.  Patient reports she has been able to bend her left knee but over the last 24 hours had difficulty doing such secondary to the swelling.      She arrived to the ER afebrile hemodynamically stable maintaining normal sats on room air.  Laboratory work overall was unremarkable.  Venous ultrasound showed no evidence of a DVT.  Orthopedic surgery was consulted and recommended admission with broad-spectrum antibiotics and plans to see in consultation in the morning.    PAST MEDICAL HISTORY:   Degenerative joint disease  IV drug use, heroin  History of hepatitis-C   History of cervical cancer    PAST SURGICAL HISTORY:     Past Surgical History:    Procedure Laterality Date    colonoscopy      ESOPHAGOGASTRODUODENOSCOPY      hemorhoidectomy      TOTAL KNEE ARTHROPLASTY Left        ALLERGIES:   Benadryl allergy decongestant, Diphenhydramine hcl, and Sulfa (sulfonamide antibiotics)    FAMILY HISTORY:   Reviewed and non-contributory     SOCIAL HISTORY:     Social History     Tobacco Use    Smoking status: Every Day     Packs/day: 0.50     Years: 13.00     Pack years: 6.50     Types: Cigarettes, Vaping with nicotine    Smokeless tobacco: Never   Substance Use Topics    Alcohol use: Not Currently     Alcohol/week: 2.0 standard drinks     Types: 1 Glasses of wine, 1 Cans of beer per week        HOME MEDICATIONS:     aspirin 81 MG Chew Take 81 mg by mouth once daily.   ferrous sulfate (FEOSOL) 325 mg (65 mg iron) Tab tablet ferrous sulfate 325 mg (65 mg iron) tablet   meloxicam (MOBIC) 15 MG tablet Take 1 tablet (15 mg total) by mouth once daily.   valACYclovir (VALTREX) 1000 MG tablet TK 1 T PO Q 8 H       REVIEW OF SYSTEMS:   Except as documented, all other systems reviewed and negative     PHYSICAL EXAM:   T 99.1 °F (37.3 °C)   BP (!) 141/89   P 77   RR 11   O2 100 %  GENERAL: awake, alert, oriented and in no acute distress, non-toxic appearing   HEENT: normocephalic atraumatic   NECK: supple   LUNGS: Clear bilaterally, no wheezing or rales, no accessory muscle use   CVS: Regular rate and rhythm, normal peripheral perfusion  ABD: Soft, non-tender, non-distended, bowel sounds present  EXTREMITIES: no clubbing or cyanosis; erythema and swelling from the ankle all the way up to the thigh; surgical wound is bandaged in surgical dressing, no obvious purulence/dehiscence  SKIN: Warm, dry.   NEURO: alert and oriented, grossly without focal deficits   PSYCHIATRIC: Cooperative    LABS AND IMAGING:     Recent Labs     05/03/23  1729   WBC 8.41   RBC 3.91*   HGB 11.4*   HCT 35.5*   MCV 90.8   MCH 29.2   MCHC 32.1*   RDW 13.4        No results for input(s): LACTIC in  the last 72 hours.  Recent Labs     05/03/23  1729   INR 0.90*     No results for input(s): HGBA1C, CHOL, TRIG, LDL, VLDL, HDL in the last 72 hours.   Recent Labs     05/03/23  1729      K 4.7   CHLORIDE 106   CO2 23   BUN 16.4   CREATININE 0.84   GLUCOSE 91   CALCIUM 9.5   ALBUMIN 3.5   GLOBULIN 3.7*   ALKPHOS 115   ALT 20   AST 39*   BILITOT 0.4     No results for input(s): BNP, CPK, TROPONINI in the last 72 hours.       X-Ray Knee 3 View Left  Narrative: EXAMINATION:  XR KNEE 3 VIEW LEFT    CLINICAL HISTORY:  Pain in left knee    COMPARISON:  X-rays dated 06/07/2022    FINDINGS:  There is satisfactory alignment following left total knee arthroplasty.  No acute fracture or malalignment.  The soft tissues are unremarkable.  Impression: No acute bony abnormality.    Electronically signed by: Mae Daly  Date:    05/03/2023  Time:    17:36      ASSESSMENT & PLAN:   Cellulitis left lower extremity   Recent left total knee replacement 04/20/2023   IV drug use/polysubstance abuse, ongoing    -broad-spectrum antibiotics, blood cultures obtained   -orthopedic surgery to see in a.m., NPO     DVT prophylaxis: scds pending surgical eval   Code status: full     If patient was admitted under observational status it is with my approval/permission.     At least 55 min was spent on this history and physical.  Time seen: 1 AM   Brad Castaneda MD

## 2023-05-04 NOTE — PROGRESS NOTES
Pharmacokinetic Initial Assessment: IV Vancomycin    Assessment/Plan:    Initiate intravenous vancomycin with loading dose of 2000 mg once followed by a maintenance dose of vancomycin 1250 mg IV every 12 hours  Desired empiric serum trough concentration is 15 to 20 mcg/mL  Draw vancomycin trough level 60 min prior to fourth dose on 05/05 at approximately 1300  Pharmacy will continue to follow and monitor vancomycin.      Please contact pharmacy at extension [phone] with any questions regarding this assessment.     Thank you for the consult,   Marty Frias       Patient brief summary:  Jeanie Wong is a 37 y.o. female initiated on antimicrobial therapy with IV Vancomycin for treatment of suspected skin & soft tissue infection    Drug Allergies:   Review of patient's allergies indicates:   Allergen Reactions    Benadryl allergy decongestant Shortness Of Breath    Diphenhydramine hcl     Sulfa (sulfonamide antibiotics) Swelling       Actual Body Weight:   77.1 kg    Renal Function:   Estimated Creatinine Clearance: 91.3 mL/min (based on SCr of 0.84 mg/dL).,     Dialysis Method (if applicable):  N/A    CBC (last 72 hours):  Recent Labs   Lab Result Units 05/03/23  1729   WBC x10(3)/mcL 8.41   Hgb g/dL 11.4*   Hct % 35.5*   Platelet x10(3)/mcL 318   Monocyte Man % 4   Eos Man % 11*       Metabolic Panel (last 72 hours):  Recent Labs   Lab Result Units 05/03/23  1729   Sodium Level mmol/L 139   Potassium Level mmol/L 4.7   Chloride mmol/L 106   Carbon Dioxide mmol/L 23   Glucose Level mg/dL 91   Glucose, UA mg/dL Negative   Blood Urea Nitrogen mg/dL 16.4   Creatinine mg/dL 0.84   Albumin Level g/dL 3.5   Bilirubin Total mg/dL 0.4   Alkaline Phosphatase unit/L 115   Aspartate Aminotransferase unit/L 39*   Alanine Aminotransferase unit/L 20       Drug levels (last 3 results):  No results for input(s): VANCOMYCINRA, VANCORANDOM, VANCOMYCINPE, VANCOPEAK, VANCOMYCINTR, VANCOTROUGH in the last 72 hours.    Microbiologic  Results:  Microbiology Results (last 7 days)       Procedure Component Value Units Date/Time    Blood culture #1 **CANNOT BE ORDERED STAT** [[id number]] Collected: 05/03/23 2305    Order Status: Resulted Specimen: Blood from Arm, Left Updated: 05/03/23 2309    Blood culture #2 **CANNOT BE ORDERED STAT** [[id number]]     Order Status: Sent Specimen: Blood

## 2023-05-04 NOTE — ED PROVIDER NOTES
Encounter Date: 5/3/2023       History     Chief Complaint   Patient presents with    Leg Pain     Pt relates that she had a total left knee replacement on 4/20/23 by Dr Pereira in [place] and is in an active recovery program whose staff encouraged pt to be evaluated due to worsening of color with redness, pain, and swelling from knee towards ankle with suture site covered with tape pta and compression stocking     See MDM    The history is provided by the patient. No  was used.   Review of patient's allergies indicates:   Allergen Reactions    Benadryl allergy decongestant Shortness Of Breath    Diphenhydramine hcl     Sulfa (sulfonamide antibiotics) Swelling     Past Medical History:   Diagnosis Date    Cancer 2003    cervix    Hepatitis C      Past Surgical History:   Procedure Laterality Date    colonoscopy      ESOPHAGOGASTRODUODENOSCOPY      hemorhoidectomy      TOTAL KNEE ARTHROPLASTY Left      Family History   Problem Relation Age of Onset    Cancer Mother     Cancer Maternal Aunt      Social History     Tobacco Use    Smoking status: Every Day     Packs/day: 0.50     Years: 13.00     Pack years: 6.50     Types: Cigarettes, Vaping with nicotine    Smokeless tobacco: Never   Substance Use Topics    Alcohol use: Not Currently     Alcohol/week: 2.0 standard drinks     Types: 1 Glasses of wine, 1 Cans of beer per week    Drug use: Not Currently     Comment: 1.5 yrs clean     Review of Systems   Constitutional:  Negative for fever.   Respiratory:  Negative for cough and shortness of breath.    Cardiovascular:  Positive for leg swelling. Negative for chest pain.   Gastrointestinal:  Negative for abdominal pain.   Genitourinary:  Negative for difficulty urinating and dysuria.   Musculoskeletal:  Positive for joint swelling. Negative for gait problem.   Skin:  Negative for color change.   Neurological:  Negative for dizziness, speech difficulty and headaches.   Psychiatric/Behavioral:   Negative for hallucinations and suicidal ideas.    All other systems reviewed and are negative.    Physical Exam     Initial Vitals [05/03/23 1650]   BP Pulse Resp Temp SpO2   (!) 143/97 103 20 98.8 °F (37.1 °C) 98 %      MAP       --         Physical Exam    Nursing note and vitals reviewed.  Constitutional: She appears well-developed and well-nourished.   HENT:   Head: Normocephalic.   Eyes: EOM are normal.   Neck:   Normal range of motion.  Cardiovascular:  Normal rate, regular rhythm, normal heart sounds and intact distal pulses.           Pulmonary/Chest: Breath sounds normal. No respiratory distress.   Abdominal: Abdomen is soft. Bowel sounds are normal. There is no abdominal tenderness.   Musculoskeletal:         General: Normal range of motion.      Cervical back: Normal range of motion.      Comments: Redness and swelling to left lower leg     Neurological: She is alert and oriented to person, place, and time. She has normal strength.   Skin: Skin is warm and dry.   Psychiatric: She has a normal mood and affect. Her behavior is normal. Judgment and thought content normal.       ED Course   Procedures  Labs Reviewed   COMPREHENSIVE METABOLIC PANEL - Abnormal; Notable for the following components:       Result Value    Globulin 3.7 (*)     Albumin/Globulin Ratio 0.9 (*)     Aspartate Aminotransferase 39 (*)     All other components within normal limits   PROTIME-INR - Abnormal; Notable for the following components:    INR 0.90 (*)     All other components within normal limits   URINALYSIS, REFLEX TO URINE CULTURE - Abnormal; Notable for the following components:    Appearance, UA SL CLOUDY (*)     Urobilinogen, UA 2.0 (*)     All other components within normal limits   CBC WITH DIFFERENTIAL - Abnormal; Notable for the following components:    RBC 3.91 (*)     Hgb 11.4 (*)     Hct 35.5 (*)     MCHC 32.1 (*)     All other components within normal limits   DRUG SCREEN, URINE (BEAKER) - Abnormal; Notable for the  following components:    Amphetamines, Urine Positive (*)     Benzodiazepine, Urine Positive (*)     Cannabinoids, Urine Positive (*)     Fentanyl, Urine Positive (*)     Opiates, Urine Positive (*)     All other components within normal limits    Narrative:     Cut off concentrations:    Amphetamines - 1000 ng/ml  Barbiturates - 200 ng/ml  Benzodiazepine - 200 ng/ml  Cannabinoids (THC) - 50 ng/ml  Cocaine - 300 ng/ml  Fentanyl - 1.0 ng/ml  MDMA - 500 ng/ml  Opiates - 300 ng/ml   Phencyclidine (PCP) - 25 ng/ml    Specimen submitted for drug analysis and tested for pH and specific gravity in order to evaluate sample integrity. Suspect tampering if specific gravity is <1.003 and/or pH is not within the range of 4.5 - 8.0  False negatives may result form substances such as bleach added to urine.  False positives may result for the presence of a substance with similar chemical structure to the drug or its metabolite.    This test provides only a PRELIMINARY analytical test result. A more specific alternate chemical method must be used in order to obtain a confirmed analytical result. Gas chromatography/mass spectrometry (GC/MS) is the preferred confirmatory method. Other chemical confirmation methods are available. Clinical consideration and professional judgement should be applied to any drug of abuse test result, particularly when preliminary positive results are used.    Positive results will be confirmed only at the physicians request. Unconfirmed screening results are to be used only for medical purposes (treatment).        MANUAL DIFFERENTIAL - Abnormal; Notable for the following components:    Eos Man 11 (*)     Abs Eos  0.9251 (*)     All other components within normal limits   APTT - Normal   PREGNANCY TEST, URINE RAPID - Normal   BLOOD CULTURE [facility]   BLOOD CULTURE [facility]   CBC W/ AUTO DIFFERENTIAL    Narrative:     The following orders were created for panel order CBC auto differential.  Procedure                                Abnormality         Status                     ---------                               -----------         ------                     CBC with Differential[[id number]]        Abnormal            Final result               Manual Differential[[id number]]          Abnormal            Final result                 Please view results for these tests on the individual orders.   SEDIMENTATION RATE   C-REACTIVE PROTEIN          Imaging Results              X-Ray Knee 3 View Left (Final result)  Result time 05/03/23 17:36:20      Final result by Mae Daly MD (05/03/23 17:36:20)                   Impression:      No acute bony abnormality.      Electronically signed by: Mae Daly  Date:    05/03/2023  Time:    17:36               Narrative:    EXAMINATION:  XR KNEE 3 VIEW LEFT    CLINICAL HISTORY:  Pain in left knee    COMPARISON:  X-rays dated 06/07/2022    FINDINGS:  There is satisfactory alignment following left total knee arthroplasty.  No acute fracture or malalignment.  The soft tissues are unremarkable.                        Wet Read by Jim Stevens MD (05/03/23 17:33:07, [facility] - Emergency Dept, Emergency Medicine)    No acute abnormalities appreciated                                     Medications   vancomycin (VANCOCIN) 2,000 mg in dextrose 5 % (D5W) 500 mL IVPB (has no administration in time range)   piperacillin-tazobactam (ZOSYN) 4.5 g in dextrose 5 % in water (D5W) 5 % 100 mL IVPB (MB+) (has no administration in time range)   morphine injection 4 mg (4 mg Intravenous Given 5/3/23 2210)   ondansetron injection 4 mg (4 mg Intravenous Given 5/3/23 2210)     Medical Decision Making:   Initial Assessment:   Historian:  Patient.  Patient is a 37-year-old female  that presents with redness and swelling to left leg that has been present few days. Associated symptoms nothing. Surrounding information is patient had a total knee replacement recently. Exacerbated  by nothing. Relieved by nothing. Patient treatment prior to arrival none. Risk factors include none. Other history pertaining to this complaint nothing.   Assessment:  See physical exam.    Differential Diagnosis:   Cellulitis leg, knee infection, septic joint  ED Management:  History was obtained.  Physical was performed.  Patient appears to have infected leg.  Her ultrasound was negative for DVT.  Dr. Pabon did speak to [facility] and Orthoped[facility] and patient was admitted by him.  Social determinants that affect her healthcare drug use.           ED Course as of 05/03/23 6777   Wed May 03, 2023   1731 Dr. Zavala at [facility] accepts patient for transfer for ultrasound to rule out DVT. [BS]   2241 Hx of drug use, heroin 2 days ago.  [RP]   2243 Dr. Mark posada. [AS]      ED Course User Index  [AS] Germania Mathews  [BS] Jim Stevens MD  [RP] Bear Pabon MD                 Clinical Impression:   Final diagnoses:  [M25.562] Left knee pain  [M79.89] Left leg swelling  [L03.90] Cellulitis        ED Disposition Condition    Admit                 DIANE Burroughs  05/03/23 6392

## 2023-05-04 NOTE — NURSING
Nurses Note -- 4 Eyes      5/4/2023   3:30 AM      Skin assessed during: Admit      [] No Altered Skin Integrity Present    []Prevention Measures Documented      [x] Yes- Altered Skin Integrity Present or Discovered   [x] LDA Added if Not in Epic (Describe Wound)   [x] New Altered Skin Integrity was Present on Admit and Documented in LDA   [x] Wound Image Taken    Wound Care Consulted? Yes    Attending Nurse:  Molly Liz RN     Second RN/Staff Member:  Radha Medel RN

## 2023-05-04 NOTE — PROCEDURES
"Jeanie Wong is a 37 y.o. female patient.    Temp: 97.7 °F (36.5 °C) (05/04/23 1626)  Pulse: 75 (05/04/23 1626)  Resp: (!) 21 (05/04/23 1631)  BP: 120/75 (05/04/23 1626)  SpO2: 98 % (05/04/23 1626)  Weight: 80.3 kg (177 lb) (05/04/23 0333)  Height: 5' 4" (162.6 cm) (05/04/23 0333)    PICC  Time out: Immediately prior to procedure a time out was called to verify the correct patient, procedure, equipment, support staff and site/side marked as required  Indications: med administration  Preparation: skin prepped with ChloraPrep  Skin prep agent dried: skin prep agent completely dried prior to procedure  Sterile barriers: all five maximum sterile barriers used - cap, mask, sterile gown, sterile gloves, and large sterile sheet  Hand hygiene: hand hygiene performed prior to central venous catheter insertion  Location details: right brachial  Catheter type: single lumen  Catheter size: 4 Fr  Catheter Length: 11cm    Ultrasound guidance: yes  Needle advanced into vessel with real time Ultrasound guidance.  Guidewire confirmed in vessel.  Sterile sheath used.          Name Gerardo eric   5/4/2023    "

## 2023-05-05 PROCEDURE — 63600175 PHARM REV CODE 636 W HCPCS: Performed by: INTERNAL MEDICINE

## 2023-05-05 PROCEDURE — 96367 TX/PROPH/DG ADDL SEQ IV INF: CPT

## 2023-05-05 PROCEDURE — S4991 NICOTINE PATCH NONLEGEND: HCPCS | Performed by: INTERNAL MEDICINE

## 2023-05-05 PROCEDURE — 25000003 PHARM REV CODE 250: Performed by: INTERNAL MEDICINE

## 2023-05-05 PROCEDURE — G0378 HOSPITAL OBSERVATION PER HR: HCPCS

## 2023-05-05 PROCEDURE — A4216 STERILE WATER/SALINE, 10 ML: HCPCS | Performed by: INTERNAL MEDICINE

## 2023-05-05 PROCEDURE — 21400001 HC TELEMETRY ROOM

## 2023-05-05 PROCEDURE — 96366 THER/PROPH/DIAG IV INF ADDON: CPT

## 2023-05-05 PROCEDURE — 96372 THER/PROPH/DIAG INJ SC/IM: CPT | Performed by: INTERNAL MEDICINE

## 2023-05-05 RX ORDER — METHADONE HYDROCHLORIDE 10 MG/1
90 TABLET ORAL DAILY
Status: DISCONTINUED | OUTPATIENT
Start: 2023-05-05 | End: 2023-05-06 | Stop reason: HOSPADM

## 2023-05-05 RX ORDER — ENOXAPARIN SODIUM 100 MG/ML
40 INJECTION SUBCUTANEOUS EVERY 24 HOURS
Status: DISCONTINUED | OUTPATIENT
Start: 2023-05-05 | End: 2023-05-06 | Stop reason: HOSPADM

## 2023-05-05 RX ORDER — IBUPROFEN 200 MG
1 TABLET ORAL DAILY
Status: DISCONTINUED | OUTPATIENT
Start: 2023-05-05 | End: 2023-05-06 | Stop reason: HOSPADM

## 2023-05-05 RX ADMIN — NICOTINE 1 PATCH: 21 PATCH, EXTENDED RELEASE TRANSDERMAL at 03:05

## 2023-05-05 RX ADMIN — VANCOMYCIN HYDROCHLORIDE 1250 MG: 1.25 INJECTION, POWDER, LYOPHILIZED, FOR SOLUTION INTRAVENOUS at 01:05

## 2023-05-05 RX ADMIN — OXYCODONE HYDROCHLORIDE 5 MG: 5 TABLET ORAL at 04:05

## 2023-05-05 RX ADMIN — ASPIRIN 81 MG 81 MG: 81 TABLET ORAL at 09:05

## 2023-05-05 RX ADMIN — Medication 10 ML: at 05:05

## 2023-05-05 RX ADMIN — Medication 10 ML: at 11:05

## 2023-05-05 RX ADMIN — OXYCODONE HYDROCHLORIDE 5 MG: 5 TABLET ORAL at 12:05

## 2023-05-05 RX ADMIN — VANCOMYCIN HYDROCHLORIDE 1250 MG: 1.25 INJECTION, POWDER, LYOPHILIZED, FOR SOLUTION INTRAVENOUS at 05:05

## 2023-05-05 RX ADMIN — METHADONE HYDROCHLORIDE 90 MG: 10 TABLET ORAL at 06:05

## 2023-05-05 RX ADMIN — Medication 10 ML: at 12:05

## 2023-05-05 RX ADMIN — PIPERACILLIN AND TAZOBACTAM 4.5 G: 4; .5 INJECTION, POWDER, LYOPHILIZED, FOR SOLUTION INTRAVENOUS; PARENTERAL at 09:05

## 2023-05-05 RX ADMIN — PIPERACILLIN AND TAZOBACTAM 4.5 G: 4; .5 INJECTION, POWDER, LYOPHILIZED, FOR SOLUTION INTRAVENOUS; PARENTERAL at 12:05

## 2023-05-05 RX ADMIN — ENOXAPARIN SODIUM 40 MG: 40 INJECTION SUBCUTANEOUS at 05:05

## 2023-05-05 NOTE — CARE UPDATE
I, Dr. Lynsey MD have seen this patient this morning, agree with current plan by Dr. Castaneda, due to recent history of left total knee replacement, will consult Infectious Disease and continue current management

## 2023-05-05 NOTE — NURSING
Spoke to pharmacist Jessica via secured chat. Gave update on vanc trough draw. Stated that I was able to contact the , Kacey, regarding delay in trough draw. Stated that I explained to pt and phlebotomist that she has a midline and you cannot draw blood from this type of line. Phlebotomist was unable to obtain any blood on multiple attempts. Pt then refused any further attempts. I also explained to pt how Vanc worked and why the troughs are important. She verbalized understanding however does not want to be stuck again. MD aware. Pharmacist gave okay to go ahead with current dose. Will notify ID physician upon rounds.

## 2023-05-05 NOTE — NURSING
Pt made nurse aware that she is currently under substance abuse treatment at an outlying facility and takes methadone daily. Spoke with attending physician regarding medication. Confirmed with pharmacy that we do carry this medication. CM reached out to treatment clinic and spoke to treating nurse who gave prescription and dosing parameters. Last dose was 05/03. Message was delivered to physician via CM. Will await new orders. Pt updated on POC.

## 2023-05-05 NOTE — CONSULTS
Infectious Diseases Consultation       Inpatient consult to Infectious Diseases  Consult performed by: Enriqueta Estrada MD  Consult ordered by: Betty Menon MD    HPI:   36 y/o female with Hx of cervical CA, hepatitis C, IVDU/Heroin use followed at the Methadone clinic in [place] who presented to ER on 5/3 with increased pain, swelling and redness to LLE. She recently underwent L total knee replacement on 4/20 per Dr Pereira in [place]. On admit she was afebrile without leukocytosis. AST 39 and ALT 20. UDS (+) benzos, opiates, amphetamines, fentanyl and cannabinoids. Blood cultures from 5/3 and 5/3 pending. L knee x-ray showed no acute bony abnormality. LLE venous ultrasound negative for DVT. U/A not impressive. Seen by Ortho, inputs noted with no plans for surgical intervention at this time. She is currently on Vancomycin and Zosyn.    Past Medical and Surgical History  Allergies :   Benadryl allergy decongestant, Diphenhydramine hcl, and Sulfa (sulfonamide antibiotics)    Medical :   She has a past medical history of Cancer (2003) and Hepatitis C.    Surgical :   She has a past surgical history that includes colonoscopy; Esophagogastroduodenoscopy; hemorhoidectomy; and Total knee arthroplasty (Left).     Family History  Her family history includes Cancer in her maternal aunt and mother.    Social History  She reports that she has been smoking cigarettes and vaping with nicotine. She has a 6.50 pack-year smoking history. She has never used smokeless tobacco. She reports that she does not currently use alcohol after a past usage of about 2.0 standard drinks per week. She reports that she does not currently use drugs.     Review of Systems   Constitutional:  Positive for malaise/fatigue.   HENT: Negative.     Eyes: Negative.    Respiratory: Negative.     Cardiovascular: Negative.    Gastrointestinal: Negative.    Genitourinary: Negative.    Musculoskeletal:         L knee pain   Skin:         LLE  "ecchymosis   Neurological: Negative.    All other systems reviewed and are negative.    Objective   Physical Exam  Vitals reviewed.   HENT:      Head: Normocephalic.   Cardiovascular:      Rate and Rhythm: Normal rate and regular rhythm.   Pulmonary:      Effort: Pulmonary effort is normal. No respiratory distress.      Breath sounds: Normal breath sounds. No wheezing.   Abdominal:      General: Bowel sounds are normal. There is no distension.      Palpations: Abdomen is soft.      Tenderness: There is no abdominal tenderness.   Musculoskeletal:         General: Swelling present. Normal range of motion.      Cervical back: Normal range of motion.      Left lower leg: Edema present.   Skin:     Findings: No rash.      Comments: L knee sx incision without erythema or drainage, (+) swelling and ecchymosis noted.    Neurological:      Mental Status: She is alert and oriented to person, place, and time.   Psychiatric:         Mood and Affect: Mood normal.         Behavior: Behavior normal.     VITAL SIGNS: 24 HR MIN & MAX LAST    Temp  Min: 97.4 °F (36.3 °C)  Max: 98 °F (36.7 °C)  98 °F (36.7 °C)        BP  Min: 118/69  Max: 149/86  118/69     Pulse  Min: 73  Max: 86  73     Resp  Min: 11  Max: 21  20    SpO2  Min: 97 %  Max: 100 %  98 %      HT: 5' 4" (162.6 cm)  WT: 80.3 kg (177 lb)  BMI: 30.4     Recent Results (from the past 24 hour(s))   Sedimentation rate    Collection Time: 05/04/23  5:54 AM   Result Value Ref Range    Sed Rate 10 0 - 20 mm/hr   C-reactive protein    Collection Time: 05/04/23  5:54 AM   Result Value Ref Range    C-Reactive Protein 11.50 (H) <5.00 mg/L   Comprehensive metabolic panel    Collection Time: 05/04/23  5:54 AM   Result Value Ref Range    Sodium Level 141 136 - 145 mmol/L    Potassium Level 4.3 3.5 - 5.1 mmol/L    Chloride 107 98 - 107 mmol/L    Carbon Dioxide 25 22 - 29 mmol/L    Glucose Level 90 74 - 100 mg/dL    Blood Urea Nitrogen 13.0 7.0 - 18.7 mg/dL    Creatinine 0.82 0.55 - 1.02 " mg/dL    Calcium Level Total 9.6 8.4 - 10.2 mg/dL    Protein Total 6.8 6.4 - 8.3 gm/dL    Albumin Level 3.5 3.5 - 5.0 g/dL    Globulin 3.3 2.4 - 3.5 gm/dL    Albumin/Globulin Ratio 1.1 1.1 - 2.0 ratio    Bilirubin Total 0.5 <=1.5 mg/dL    Alkaline Phosphatase 127 40 - 150 unit/L    Alanine Aminotransferase 18 0 - 55 unit/L    Aspartate Aminotransferase 36 (H) 5 - 34 unit/L    eGFR >60 mls/min/1.73/m2   CBC with Differential    Collection Time: 05/04/23  5:54 AM   Result Value Ref Range    WBC 6.00 4.50 - 11.50 x10(3)/mcL    RBC 4.23 4.20 - 5.40 x10(6)/mcL    Hgb 12.5 12.0 - 16.0 g/dL    Hct 37.7 37.0 - 47.0 %    MCV 89.1 80.0 - 94.0 fL    MCH 29.6 27.0 - 31.0 pg    MCHC 33.2 33.0 - 36.0 g/dL    RDW 13.7 11.5 - 17.0 %    Platelet 256 130 - 400 x10(3)/mcL    MPV 11.2 (H) 7.4 - 10.4 fL    Neut % 37.2 %    Lymph % 38.8 %    Mono % 7.7 %    Eos % 15.0 %    Basophil % 0.8 %    Lymph # 2.33 0.6 - 4.6 x10(3)/mcL    Neut # 2.23 2.1 - 9.2 x10(3)/mcL    Mono # 0.46 0.1 - 1.3 x10(3)/mcL    Eos # 0.90 0 - 0.9 x10(3)/mcL    Baso # 0.05 <=0.2 x10(3)/mcL    IG# 0.03 0 - 0.04 x10(3)/mcL    IG% 0.5 %    NRBC% 0.0 %       Imaging  5/3/23 L knee x-ray  Impression:       No acute bony abnormality.      Impression  LLE cellulitis  Recent L TKR on 4/20  Hx of IVDU  Chronic Hepatitis C  Transaminits  Anemia    Recommendations  I agree with the management of Ms Wong. This is a 36 y/o female who presented to ER with increased pain, swelling and redness to LLE. She recently underwent L TKR on 4/20 by Dr Pereira in [place]. On admit she was afebrile without leukocytosis. LLE venous ultrasound showed no DVT. L knee x-ray without bony abnormalities. Seen by Ortho with inputs noted including no plans for surgical intervention at this time and recommends to follow up with her Orthopedic Surgeon on discharge. We will continue Vancomycin and Zosyn for now with plans to transition to oral Doxycycline and Cipro x14 days on discharge when ready.  She is to continue to follow with Dr Pereira as scheduled. She does have a Hx of Hep C and can be further managed as an outpatient. Discussed with patient and nursing. Thank you for this consultation, we will continue to follow Ms Wong with you.

## 2023-05-05 NOTE — PLAN OF CARE
05/05/23 1549   Discharge Assessment   Assessment Type Discharge Planning Assessment   Confirmed/corrected address, phone number and insurance Yes   Confirmed Demographics Correct on Facesheet   Source of Information patient   Reason For Admission cellulitis   People in Home parent(s)   Do you expect to return to your current living situation? Yes   Do you have help at home or someone to help you manage your care at home? Yes   Who are your caregiver(s) and their phone number(s)? mother, amanuel   Prior to hospitilization cognitive status: Unable to Assess   Current cognitive status: Alert/Oriented   Equipment Currently Used at Home walker, rolling;shower chair   Readmission within 30 days? Yes  ([facility] knee surg 4/20/23)   Patient currently being followed by outpatient case management? No   Do you currently have service(s) that help you manage your care at home? No   Do you take prescription medications? Yes   Do you have prescription coverage? Yes   Coverage medicaid   Who is going to help you get home at discharge? family   How do you get to doctors appointments? family or friend will provide   Are you on dialysis? No   Discharge Plan A Home with family   Discharge Plan B Home with family   DME Needed Upon Discharge  none   Discharge Plan discussed with: Patient   Discharge Barriers Identified None   OTHER   Name(s) of People in Home mom quinton     Per pt states she resides with mom Quinton after surgery in [place] with dr. Pereirapt has a rw and shower chair at home.  Pt claims she  goes to methadone clinic daily. States was on heroin and meth.  I called the clinic and spoke to nurse Jeong , who confirms .  She requested I SEND  CLINICALS TO HER WHEN PT IS DISCHARGED. FAX NUMBER [phone] PROGRAMS

## 2023-05-05 NOTE — PROGRESS NOTES
[facility] Hospital Medicine Progress Note        Chief Complaint: Inpatient Follow-up for LLE cellulitis,Recent L TKR on 4/20    HPI: Patient is a 37-year-old female with a history of polysubstance abuse for which she is in a rehab program and is followed at the methadone clinic and [place], who had degenerative joint disease of her left knee and underwent a total left knee replacement 04/20/2023 in [place] by a Dr. Pereira.  She is not followed up since surgery but is due to do so on the 12th however she was urged to come to the ER by her rehab program due to worsening erythema and swelling of her left lower extremity.  Patient reports she has been able to bend her left knee but over the last 24 hours had difficulty doing such secondary to the swelling.      She arrived to the ER afebrile hemodynamically stable maintaining normal sats on room air.  Laboratory work overall was unremarkable. Venous ultrasound showed no evidence of a DVT.  Orthopedic surgery was consulted and recommended admission with broad-spectrum antibiotics and plans to see in consultation in the morning.    Interval Hx:   Patient was seen and examined at bedside,denies any fever, pain better,seen by ID, plan to continue Vancomycin and Zosyn for now with plans to transition to oral Doxycycline and Cipro x14 days on discharge     Objective/physical exam:  General: In no acute distress, afebrile  Chest: Clear to auscultation bilaterally  Heart: RRR, +S1, S2, no appreciable murmur  Abdomen: Soft, nontender, BS +  MSK: Warm, no lower extremity edema, no clubbing or cyanosis  Neurologic: Alert and oriented x4, Cranial nerve II-XII intact, Strength 5/5 in all 4 extremities    VITAL SIGNS: 24 HRS MIN & MAX LAST   Temp  Min: 97.7 °F (36.5 °C)  Max: 98 °F (36.7 °C) 97.9 °F (36.6 °C)   BP  Min: 109/68  Max: 137/80 109/68   Pulse  Min: 73  Max: 97  79   Resp  Min: 18  Max: 21 18   SpO2  Min: 96 %  Max: 99 % 96 %        Recent Labs   Lab 05/03/23  1729 05/04/23  0554   WBC 8.41 6.00   RBC 3.91* 4.23   HGB 11.4* 12.5   HCT 35.5* 37.7   MCV 90.8 89.1   MCH 29.2 29.6   MCHC 32.1* 33.2   RDW 13.4 13.7    256   MPV 10.3 11.2*       Recent Labs   Lab 05/03/23  1729 05/04/23  0554    141   K 4.7 4.3   CO2 23 25   BUN 16.4 13.0   CREATININE 0.84 0.82   CALCIUM 9.5 9.6   ALBUMIN 3.5 3.5   ALKPHOS 115 127   ALT 20 18   AST 39* 36*   BILITOT 0.4 0.5          Microbiology Results (last 7 days)       Procedure Component Value Units Date/Time    Blood culture #2 **CANNOT BE ORDERED STAT** [[id number]]  (Normal) Collected: 05/04/23 0516    Order Status: Completed Specimen: Blood from Arm, Right Updated: 05/05/23 0700     CULTURE, BLOOD ([facility]) No Growth At 24 Hours    Blood culture #1 **CANNOT BE ORDERED STAT** [[id number]]  (Normal) Collected: 05/03/23 2305    Order Status: Completed Specimen: Blood from Arm, Left Updated: 05/05/23 0000     CULTURE, BLOOD ([facility]) No Growth At 24 Hours             See below for Radiology    Scheduled Med:   aspirin  81 mg Oral Daily    piperacillin-tazobactam (ZOSYN) IVPB  4.5 g Intravenous Q8H    sodium chloride 0.9%  10 mL Intravenous Q6H    vancomycin (VANCOCIN) IVPB  1,250 mg Intravenous Q12H        Continuous Infusions:       PRN Meds:  acetaminophen, melatonin, oxyCODONE, sodium chloride 0.9%, Flushing PICC Protocol **AND** sodium chloride 0.9% **AND** sodium chloride 0.9%, Pharmacy to dose Vancomycin consult **AND** vancomycin - pharmacy to dose       Assessment/Plan:  Cellulitis left lower extremity   Recent left total knee replacement 04/20/2023     Plan:  ID was consulted, appreciate recommendations,Continue Vancomycin and Zosyn, plans to transition to oral antibiotics  Will f/u WBC, fever curve          VTE prophylaxis: lovenox    Patient condition:  Fair    Anticipated discharge and Disposition:   per ID recs      All diagnosis and differential diagnosis have been reviewed; assessment and  plan has been documented; I have personally reviewed the labs and test results that are presently available; I have reviewed the patients medication list; I have reviewed the consulting providers response and recommendations. I have reviewed or attempted to review medical records based upon their availability    All of the patient's questions have been  addressed and answered. Patient's is agreeable to the above stated plan. I will continue to monitor closely and make adjustments to medical management as needed.  _____________________________________________________________________    Nutrition Status:    Radiology:  CV Ultrasound doppler venous DVT leg left  · There is no evidence of a left lower extremity DVT.     Left lower extremity negative for deep vein thrombus at time of exam.      Betty Menon MD   05/05/2023

## 2023-05-06 VITALS
HEART RATE: 81 BPM | HEIGHT: 64 IN | WEIGHT: 177 LBS | OXYGEN SATURATION: 97 % | BODY MASS INDEX: 30.22 KG/M2 | DIASTOLIC BLOOD PRESSURE: 70 MMHG | RESPIRATION RATE: 19 BRPM | SYSTOLIC BLOOD PRESSURE: 101 MMHG | TEMPERATURE: 98 F

## 2023-05-06 PROCEDURE — 63600175 PHARM REV CODE 636 W HCPCS: Performed by: INTERNAL MEDICINE

## 2023-05-06 PROCEDURE — S4991 NICOTINE PATCH NONLEGEND: HCPCS | Performed by: INTERNAL MEDICINE

## 2023-05-06 PROCEDURE — A4216 STERILE WATER/SALINE, 10 ML: HCPCS | Performed by: INTERNAL MEDICINE

## 2023-05-06 PROCEDURE — 96366 THER/PROPH/DIAG IV INF ADDON: CPT

## 2023-05-06 PROCEDURE — G0378 HOSPITAL OBSERVATION PER HR: HCPCS

## 2023-05-06 PROCEDURE — 25000003 PHARM REV CODE 250: Performed by: INTERNAL MEDICINE

## 2023-05-06 RX ORDER — CIPROFLOXACIN 500 MG/1
500 TABLET ORAL EVERY 12 HOURS
Qty: 12 TABLET | Refills: 0 | Status: SHIPPED | OUTPATIENT
Start: 2023-05-06

## 2023-05-06 RX ORDER — ACETAMINOPHEN 325 MG/1
650 TABLET ORAL EVERY 8 HOURS PRN
Qty: 60 TABLET | Refills: 0 | Status: SHIPPED | OUTPATIENT
Start: 2023-05-06 | End: 2023-05-06 | Stop reason: SDUPTHER

## 2023-05-06 RX ORDER — ACETAMINOPHEN 325 MG/1
650 TABLET ORAL EVERY 8 HOURS PRN
Qty: 60 TABLET | Refills: 0 | Status: SHIPPED | OUTPATIENT
Start: 2023-05-06

## 2023-05-06 RX ORDER — CIPROFLOXACIN 500 MG/1
500 TABLET ORAL EVERY 12 HOURS
Qty: 12 TABLET | Refills: 0 | Status: SHIPPED | OUTPATIENT
Start: 2023-05-06 | End: 2023-05-06 | Stop reason: SDUPTHER

## 2023-05-06 RX ORDER — DOXYCYCLINE 100 MG/1
100 CAPSULE ORAL EVERY 12 HOURS
Qty: 24 CAPSULE | Refills: 0 | Status: SHIPPED | OUTPATIENT
Start: 2023-05-06 | End: 2023-05-06 | Stop reason: SDUPTHER

## 2023-05-06 RX ORDER — DOXYCYCLINE 100 MG/1
100 CAPSULE ORAL EVERY 12 HOURS
Qty: 24 CAPSULE | Refills: 0 | Status: SHIPPED | OUTPATIENT
Start: 2023-05-06 | End: 2023-05-18

## 2023-05-06 RX ADMIN — ASPIRIN 81 MG 81 MG: 81 TABLET ORAL at 08:05

## 2023-05-06 RX ADMIN — VANCOMYCIN HYDROCHLORIDE 1250 MG: 1.25 INJECTION, POWDER, LYOPHILIZED, FOR SOLUTION INTRAVENOUS at 05:05

## 2023-05-06 RX ADMIN — NICOTINE 1 PATCH: 21 PATCH, EXTENDED RELEASE TRANSDERMAL at 08:05

## 2023-05-06 RX ADMIN — Medication 10 ML: at 05:05

## 2023-05-06 RX ADMIN — PIPERACILLIN AND TAZOBACTAM 4.5 G: 4; .5 INJECTION, POWDER, LYOPHILIZED, FOR SOLUTION INTRAVENOUS; PARENTERAL at 07:05

## 2023-05-06 NOTE — PROGRESS NOTES
Patient refused previous lab attempts to draw levels for Vancomycin.   Current regimen is Vancomycin 1250 mg q12h  I have ordered a random level to be drawn on 5/7/23 with AM labs.      For any questions please call pharmacy at extension [phone]

## 2023-05-06 NOTE — NURSING
Pt room smelled strong of Cannabis, security called and room was checked. Nothing found. Pt family member stated he smoked before coming and it was the smell from his clothes.

## 2023-05-06 NOTE — PROGRESS NOTES
Lab unable to obtain vancomycin trough and pt refused further attempts.  Advised nursing to continue vancomycin until input from hospital medicine/ID physicians.

## 2023-05-09 LAB
BACTERIA BLD CULT: NORMAL
BACTERIA BLD CULT: NORMAL

## 2023-05-10 ENCOUNTER — PATIENT OUTREACH (OUTPATIENT)
Dept: ADMINISTRATIVE | Facility: CLINIC | Age: 38
End: 2023-05-10
Payer: MEDICAID

## 2023-05-10 ENCOUNTER — PATIENT MESSAGE (OUTPATIENT)
Dept: ADMINISTRATIVE | Facility: CLINIC | Age: 38
End: 2023-05-10
Payer: MEDICAID

## 2023-05-10 NOTE — PROGRESS NOTES
C3 nurse attempted to contact Jeanie Wong for a TCC post hospital discharge follow up call. No answer. The patient does not have a scheduled HOSFU appointment, and the pt does not have an [facility] PCP.

## 2023-05-11 NOTE — PROGRESS NOTES
C3 nurse spoke with Jeanie Wong for a TCC post hospital discharge follow up call. The patient has a scheduled HOSFU appointment with Ely Cox NP on 6/15/23 @ 8:00.

## 2023-05-15 NOTE — DISCHARGE SUMMARY
[facility] Hospital Medicine Discharge Summary    Admit Date: 5/3/2023  Discharge Date and Time: 5/6/23  Admitting Physician:  Team  Discharging Physician: Betty Menon MD.  Primary Care Physician: Primary Doctor No  Consults: Infectious Disease    Discharge Diagnoses:  Cellulitis left lower extremity   Recent left total knee replacement 04/20/2023   Hx of IVDU        Hospital Course:   Patient is a 37-year-old female with a history of polysubstance abuse for which she is in a rehab program and is followed at the methadone clinic and [place], who had degenerative joint disease of her left knee and underwent a total left knee replacement 04/20/2023 in [place] by madelyn Pereira.  She is not followed up since surgery but is due to do so on the 12th however she was urged to come to the ER by her rehab program due to worsening erythema and swelling of her left lower extremity.  Patient reports she has been able to bend her left knee but over the last 24 hours had difficulty doing such secondary to the swelling.      She arrived to the ER afebrile hemodynamically stable maintaining normal sats on room air.  Laboratory work overall was unremarkable. Venous ultrasound showed no evidence of a DVT.  Orthopedic surgery was consulted and recommended admission with broad-spectrum antibiotics . Orthopedics recommends to follow up with her own Orthopedic Surgeon. ID recommendations were obtained before switching to oral antibiotics.      Pt was seen and examined on the day of discharge, Patient was stable on discharge,all questions were answered and emphasized the importance of follow ups. , discharged on oral antibiotics.      Vitals:  VITAL SIGNS: 24 HRS MIN & MAX LAST   No data recorded 97.6 °F (36.4 °C)   No data recorded 101/70   No data recorded  81   No data recorded 19   No data recorded 97 %       Physical Exam:  General: In no acute distress, afebrile  Chest: Clear to  auscultation bilaterally  Heart: RRR, +S1, S2, no appreciable murmur  Abdomen: Soft, nontender, BS +  MSK: Warm, no lower extremity edema, no clubbing or cyanosis  Neurologic: Alert and oriented x4, Cranial nerve II-XII intact, Strength 5/5 in all 4 extremities    Procedures Performed: see full chart  Significant Diagnostic Studies: See Full reports for all details    No results for input(s): WBC, RBC, HGB, HCT, MCV, MCH, MCHC, RDW, PLT, MPV, GRAN, LYMPH, MONO, BASO, NRBC in the last 168 hours.    No results for input(s): NA, K, CL, CO2, ANIONGAP, BUN, CREATININE, GLU, CALCIUM, PH, MG, ALBUMIN, PROT, ALKPHOS, ALT, AST, BILITOT in the last 168 hours.     Microbiology Results (last 7 days)       ** No results found for the last 168 hours. **             CV Ultrasound doppler venous DVT leg left  · There is no evidence of a left lower extremity DVT.     Left lower extremity negative for deep vein thrombus at time of exam.         Medication List        START taking these medications      acetaminophen 325 MG tablet  Commonly known as: TYLENOL  Take 2 tablets (650 mg total) by mouth every 8 (eight) hours as needed for Pain.     ciprofloxacin HCl 500 MG tablet  Commonly known as: CIPRO  Take 1 tablet (500 mg total) by mouth every 12 (twelve) hours.     doxycycline 100 MG Cap  Commonly known as: VIBRAMYCIN  Take 1 capsule (100 mg total) by mouth every 12 (twelve) hours. for 12 days            CONTINUE taking these medications      aspirin 81 MG Chew     ferrous sulfate 325 mg (65 mg iron) Tab tablet  Commonly known as: FEOSOL     meloxicam 15 MG tablet  Commonly known as: MOBIC  Take 1 tablet (15 mg total) by mouth once daily.     valACYclovir 1000 MG tablet  Commonly known as: VALTREX               Where to Get Your Medications        These medications were sent to [id number] - [address] & [address] [address]      Hours:  24-hours Phone: [phone]   acetaminophen 325 MG tablet  ciprofloxacin HCl 500 MG tablet  doxycycline 100 MG Cap          Explained in detail to the patient about the discharge plan, medications, and follow-up visits. Pt understands and agrees with the treatment plan  Discharge Disposition: Home or Self Care   Discharged Condition: stable  Diet-    Medications Per DC med rec  Activities as tolerated    For further questions contact hospitalist office    Discharge time 33 minutes    For worsening symptoms, chest pain, shortness of breath, increased abdominal pain, high grade fever, stroke or stroke like symptoms, immediately go to the nearest Emergency Room or call 911 as soon as possible.      Betty Gonzalez M.D, on 5/15/2023. at 8:49 AM.

## 2023-07-26 ENCOUNTER — OFFICE VISIT (OUTPATIENT)
Dept: ORTHOPEDICS | Facility: CLINIC | Age: 38
End: 2023-07-26
Payer: MEDICAID

## 2023-07-26 VITALS
HEIGHT: 64 IN | HEART RATE: 53 BPM | DIASTOLIC BLOOD PRESSURE: 88 MMHG | BODY MASS INDEX: 28.51 KG/M2 | SYSTOLIC BLOOD PRESSURE: 131 MMHG | WEIGHT: 167 LBS

## 2023-07-26 DIAGNOSIS — Z96.652 S/P TOTAL KNEE ARTHROPLASTY, LEFT: Primary | ICD-10-CM

## 2023-07-26 DIAGNOSIS — B18.2 CHRONIC HEPATITIS C WITHOUT HEPATIC COMA: ICD-10-CM

## 2023-07-26 PROCEDURE — 99499 UNLISTED E&M SERVICE: CPT | Mod: S$PBB,,, | Performed by: SPECIALIST

## 2023-07-26 PROCEDURE — 99213 OFFICE O/P EST LOW 20 MIN: CPT | Mod: PBBFAC

## 2023-07-26 PROCEDURE — 99499 NO LOS: ICD-10-PCS | Mod: S$PBB,,, | Performed by: SPECIALIST

## 2023-07-26 RX ORDER — ALBUTEROL SULFATE 90 UG/1
AEROSOL, METERED RESPIRATORY (INHALATION)
COMMUNITY

## 2023-07-26 RX ORDER — ONDANSETRON 4 MG/1
4 TABLET, FILM COATED ORAL EVERY 6 HOURS PRN
COMMUNITY
Start: 2023-04-21

## 2023-07-26 NOTE — PROGRESS NOTES
\[facility] [place] [facility]\"" Orthopaedic Surgery H&P Note  In brief, Jeanie Wong is a 38 y.o. female who presents to clinic for evaluation for issues with left knee and left ankle.    HPI:  Patient is a 38-year-old female history of hepatitis-C, substance use disorder who presents to clinic for evaluation for issues with left knee and left ankle.  Patient underwent left total knee arthroplasty in [place] about 3 months ago.  She states that shortly after her surgery she was hospitalized for left lower extremity cellulitis.  She denies any issues with the knee then.  No wound healing issues.  The knee was never aspirated.  She improved on antibiotics an took about 6 weeks to start physical therapy.  Now she is been working with physical therapy for about 6 weeks and noted increased bruising swelling and warmth to the left knee.  No drainage.  The knee only bothers her when she tries to flex it, and after increased activity.  Does not bother her at rest.  Patient had inflammatory labs in May that were slightly elevated (CRP, ESR normal).  No fevers or chills.    Of note patient also has been having left ankle pain.  She localizes the tenderness over the posterior tib tendon.  PMH:   Past Medical History:   Diagnosis Date    Cancer 2003    cervix    Hepatitis C      PSH:   Past Surgical History:   Procedure Laterality Date    colonoscopy      ESOPHAGOGASTRODUODENOSCOPY      hemorhoidectomy      TOTAL KNEE ARTHROPLASTY Left      SH:   Social History     Socioeconomic History    Marital status: Legally    Tobacco Use    Smoking status: Every Day     Packs/day: 0.50     Years: 13.00     Pack years: 6.50     Types: Cigarettes, Vaping with nicotine    Smokeless tobacco: Never   Substance and Sexual Activity    Alcohol use: Not Currently     Alcohol/week: 2.0 standard drinks     Types: 1 Glasses of wine, 1 Cans of beer per week    Drug use: Not Currently     Comment: 1.5 yrs clean    Sexual activity: Yes     Partners: Male     Birth  control/protection: None     FH:   Family History   Problem Relation Age of Onset    Cancer Mother     Cancer Maternal Aunt      Allergies:   Review of patient's allergies indicates:   Allergen Reactions    Benadryl allergy decongestant Shortness Of Breath    Diphenhydramine hcl     Sulfa (sulfonamide antibiotics) Swelling     ROS:  Constitutional- no fever, fatigue, weakness  Eye- no vision loss, eye redness, drainage, or pain  ENMT- no sore throat, ear pain, sinus pain/congestion, nasal congestion/drainage  Respiratory- no cough, wheezing, or shortness of breath  CV- no chest pain, no palpitations, no edema  GI- no N/V/D; no abdominal pain  Physical Exam:  Vitals:    07/26/23 1221   BP: 131/88   Pulse: (!) 53     General: NAD  Cardio: RRR by peripheral pulse  Pulm: Normal WOB on room air, symmetric chest rise  Abd: Soft, NT/ND  MSK:  Left lower extremity:  Left total knee arthroplasty incision is well healed with no erythema or induration range of motion 0-95 degrees   Stable varus/valgus stress   Tenderness to palpation over the posterior tib tendon, pain with inversion of the foot   Planovalgus foot  Sensation intact to light touch distally   Motor intact EHL/FHL/gastrocsoleus tib ant   Pulses 2+    Imaging:   On independent review of left knee radiographs from May 2023 patient is status post left total knee arthroplasty with well-positioned components free of complication.  Large polyethylene insert.    Assessment/Plan:  Patient is a 38-year-old female history of untreated hepatitis-C, substance use disorder recent left total knee arthroplasty back in April now with postoperative stiffness.  On evaluation today I do not think that she has an acute periprosthetic joint infection however the history of cellulitis requiring IV antibiotics shortly after knee arthroplasties concerning.  We will get inflammatory labs today to try and as compared to the ones May.  If they are elevated we will consider a left knee  arthrocentesis.  In the meantime continue working on range of motion and physical therapy her range of motion is not perfect but it is acceptable at 0-95.  In terms of her ankle recommend lace-up ankle brace to provide some immobilization for posterior tib tendon.  I do not want to put her in a boot and interfere with her total knee rehab.  Also recommend local anti-inflammatories in the form of Voltaren gel.  Discussed use of shoe insert after tendinitis improved.    Jon Verma MD  [facility]

## 2023-07-26 NOTE — PROGRESS NOTES
Faculty Attestation: Jeanie Wong  was seen at [facility] in the Orthopaedic Clinic. Discussed with the resident at the time of the visit. History of Present Illness, Physical Exam, and Assessment and Plan reviewed. Treatment plan is reasonable and appropriate. Compliance with treatment recommendations is important. Discussed with the resident at the time of the visit.  No procedure was performed.     Kwasi Noriega MD MD  Orthopaedic Surgery

## 2024-01-29 ENCOUNTER — LAB VISIT (OUTPATIENT)
Dept: LAB | Facility: HOSPITAL | Age: 39
End: 2024-01-29
Attending: FAMILY MEDICINE
Payer: MEDICAID

## 2024-01-29 DIAGNOSIS — Z00.00 ROUTINE GENERAL MEDICAL EXAMINATION AT A HEALTH CARE FACILITY: Primary | ICD-10-CM

## 2024-01-29 DIAGNOSIS — Z96.652 S/P TOTAL KNEE ARTHROPLASTY, LEFT: ICD-10-CM

## 2024-01-29 LAB
ALBUMIN SERPL-MCNC: 3.8 G/DL (ref 3.5–5)
ALBUMIN/GLOB SERPL: 1.2 RATIO (ref 1.1–2)
ALP SERPL-CCNC: 77 UNIT/L (ref 40–150)
ALT SERPL-CCNC: 26 UNIT/L (ref 0–55)
AST SERPL-CCNC: 33 UNIT/L (ref 5–34)
BASOPHILS # BLD AUTO: 0.1 X10(3)/MCL
BASOPHILS NFR BLD AUTO: 1.4 %
BILIRUB SERPL-MCNC: 0.5 MG/DL
BUN SERPL-MCNC: 10.8 MG/DL (ref 7–18.7)
CALCIUM SERPL-MCNC: 9.3 MG/DL (ref 8.4–10.2)
CHLORIDE SERPL-SCNC: 105 MMOL/L (ref 98–107)
CO2 SERPL-SCNC: 28 MMOL/L (ref 22–29)
CREAT SERPL-MCNC: 0.77 MG/DL (ref 0.55–1.02)
CRP SERPL-MCNC: <1 MG/L
EOSINOPHIL # BLD AUTO: 0.55 X10(3)/MCL (ref 0–0.9)
EOSINOPHIL NFR BLD AUTO: 7.5 %
ERYTHROCYTE [DISTWIDTH] IN BLOOD BY AUTOMATED COUNT: 12.6 % (ref 11.5–17)
ERYTHROCYTE [SEDIMENTATION RATE] IN BLOOD: 3 MM/HR (ref 0–20)
GFR SERPLBLD CREATININE-BSD FMLA CKD-EPI: >60 MLS/MIN/1.73/M2
GLOBULIN SER-MCNC: 3.2 GM/DL (ref 2.4–3.5)
GLUCOSE SERPL-MCNC: 118 MG/DL (ref 74–100)
HCT VFR BLD AUTO: 42.5 % (ref 37–47)
HGB BLD-MCNC: 14.2 G/DL (ref 12–16)
IMM GRANULOCYTES # BLD AUTO: 0.02 X10(3)/MCL (ref 0–0.04)
IMM GRANULOCYTES NFR BLD AUTO: 0.3 %
LYMPHOCYTES # BLD AUTO: 1.99 X10(3)/MCL (ref 0.6–4.6)
LYMPHOCYTES NFR BLD AUTO: 27.3 %
MCH RBC QN AUTO: 29.2 PG (ref 27–31)
MCHC RBC AUTO-ENTMCNC: 33.4 G/DL (ref 33–36)
MCV RBC AUTO: 87.4 FL (ref 80–94)
MONOCYTES # BLD AUTO: 0.51 X10(3)/MCL (ref 0.1–1.3)
MONOCYTES NFR BLD AUTO: 7 %
NEUTROPHILS # BLD AUTO: 4.12 X10(3)/MCL (ref 2.1–9.2)
NEUTROPHILS NFR BLD AUTO: 56.5 %
NRBC BLD AUTO-RTO: 0 %
PLATELET # BLD AUTO: 223 X10(3)/MCL (ref 130–400)
PMV BLD AUTO: 11 FL (ref 7.4–10.4)
POTASSIUM SERPL-SCNC: 4.3 MMOL/L (ref 3.5–5.1)
PROT SERPL-MCNC: 7 GM/DL (ref 6.4–8.3)
RBC # BLD AUTO: 4.86 X10(6)/MCL (ref 4.2–5.4)
SODIUM SERPL-SCNC: 141 MMOL/L (ref 136–145)
T PALLIDUM AB SER QL: NONREACTIVE
WBC # SPEC AUTO: 7.29 X10(3)/MCL (ref 4.5–11.5)

## 2024-01-29 PROCEDURE — 86140 C-REACTIVE PROTEIN: CPT

## 2024-01-29 PROCEDURE — 36415 COLL VENOUS BLD VENIPUNCTURE: CPT

## 2024-01-29 PROCEDURE — 85652 RBC SED RATE AUTOMATED: CPT

## 2024-01-29 PROCEDURE — 85025 COMPLETE CBC W/AUTO DIFF WBC: CPT

## 2024-01-29 PROCEDURE — 86780 TREPONEMA PALLIDUM: CPT

## 2024-01-29 PROCEDURE — 80053 COMPREHEN METABOLIC PANEL: CPT

## 2024-07-29 ENCOUNTER — HOSPITAL ENCOUNTER (EMERGENCY)
Facility: HOSPITAL | Age: 39
Discharge: HOME | End: 2024-07-29
Attending: EMERGENCY MEDICINE
Payer: MEDICAID

## 2024-07-29 VITALS
SYSTOLIC BLOOD PRESSURE: 109 MMHG | WEIGHT: 152 LBS | OXYGEN SATURATION: 98 % | RESPIRATION RATE: 18 BRPM | DIASTOLIC BLOOD PRESSURE: 75 MMHG | BODY MASS INDEX: 25.95 KG/M2 | TEMPERATURE: 98 F | HEART RATE: 98 BPM | HEIGHT: 64 IN

## 2024-07-29 DIAGNOSIS — M25.562 ACUTE PAIN OF LEFT KNEE: Primary | ICD-10-CM
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
## 2024-07-29 PROCEDURE — 99283 EMERGENCY DEPT VISIT LOW MDM: CPT | Mod: 25

## 2024-07-29 PROCEDURE — 25000003 PHARM REV CODE 250: Performed by: EMERGENCY MEDICINE

## 2024-07-29 PROCEDURE — 29505 APPLICATION LONG LEG SPLINT: CPT | Mod: LT

## 2024-07-29 RX ORDER — HYDROCODONE BITARTRATE AND ACETAMINOPHEN 7.5; 325 MG/1; MG/1
1 TABLET ORAL EVERY 6 HOURS PRN
Qty: 18 TABLET | Refills: 0 | Status: ON HOLD | OUTPATIENT
Start: 2024-07-29

## 2024-07-29 RX ORDER — HYDROCODONE BITARTRATE AND ACETAMINOPHEN 10; 325 MG/1; MG/1
1 TABLET ORAL
Status: COMPLETED | OUTPATIENT
Start: 2024-07-29 | End: 2024-07-29

## 2024-07-29 RX ADMIN — HYDROCODONE BITARTRATE AND ACETAMINOPHEN 1 TABLET: 10; 325 TABLET ORAL at 02:07

## 2024-08-07 ENCOUNTER — HOSPITAL ENCOUNTER (INPATIENT)
Facility: HOSPITAL | Age: 39
LOS: 5 days | Discharge: LEFT BEFORE DISCHARGE | DRG: 561 | End: 2024-08-12
Attending: INTERNAL MEDICINE | Admitting: INTERNAL MEDICINE
Payer: MEDICAID

## 2024-08-07 DIAGNOSIS — M25.562 ACUTE PAIN OF LEFT KNEE: Primary | ICD-10-CM

## 2024-08-07 DIAGNOSIS — T84.59XA INFECTION OF TOTAL KNEE REPLACEMENT, INITIAL ENCOUNTER: ICD-10-CM

## 2024-08-07 DIAGNOSIS — R07.9 CHEST PAIN: ICD-10-CM

## 2024-08-07 DIAGNOSIS — M25.462 EFFUSION OF LEFT KNEE: ICD-10-CM

## 2024-08-07 DIAGNOSIS — Z96.659 INFECTION OF TOTAL KNEE REPLACEMENT, INITIAL ENCOUNTER: ICD-10-CM

## 2024-08-07 LAB
ALBUMIN SERPL-MCNC: 2.5 G/DL (ref 3.5–5)
ALBUMIN/GLOB SERPL: 0.6 RATIO (ref 1.1–2)
ALP SERPL-CCNC: 127 UNIT/L (ref 40–150)
ALT SERPL-CCNC: 17 UNIT/L (ref 0–55)
ANION GAP SERPL CALC-SCNC: 13 MEQ/L
AST SERPL-CCNC: 38 UNIT/L (ref 5–34)
B-HCG UR QL: NEGATIVE
BASOPHILS # BLD AUTO: 0.09 X10(3)/MCL
BASOPHILS NFR BLD AUTO: 0.6 %
BILIRUB SERPL-MCNC: 0.4 MG/DL
BUN SERPL-MCNC: 11.6 MG/DL (ref 7–18.7)
CALCIUM SERPL-MCNC: 9.2 MG/DL (ref 8.4–10.2)
CHLORIDE SERPL-SCNC: 101 MMOL/L (ref 98–107)
CO2 SERPL-SCNC: 25 MMOL/L (ref 22–29)
CREAT SERPL-MCNC: 0.67 MG/DL (ref 0.55–1.02)
CREAT/UREA NIT SERPL: 17
CRP SERPL HS-MCNC: 115.72 MG/L
EOSINOPHIL # BLD AUTO: 0.54 X10(3)/MCL (ref 0–0.9)
EOSINOPHIL NFR BLD AUTO: 3.5 %
ERYTHROCYTE [DISTWIDTH] IN BLOOD BY AUTOMATED COUNT: 13.2 % (ref 11.5–17)
ERYTHROCYTE [SEDIMENTATION RATE] IN BLOOD: 64 MM/HR (ref 0–20)
GFR SERPLBLD CREATININE-BSD FMLA CKD-EPI: >60 ML/MIN/1.73/M2
GLOBULIN SER-MCNC: 4.2 GM/DL (ref 2.4–3.5)
GLUCOSE SERPL-MCNC: 104 MG/DL (ref 74–100)
HCT VFR BLD AUTO: 36.6 % (ref 37–47)
HGB BLD-MCNC: 12.5 G/DL (ref 12–16)
IMM GRANULOCYTES # BLD AUTO: 0.78 X10(3)/MCL (ref 0–0.04)
IMM GRANULOCYTES NFR BLD AUTO: 5 %
LACTATE SERPL-SCNC: 2 MMOL/L (ref 0.5–2.2)
LYMPHOCYTES # BLD AUTO: 2.3 X10(3)/MCL (ref 0.6–4.6)
LYMPHOCYTES NFR BLD AUTO: 14.8 %
MCH RBC QN AUTO: 29.1 PG (ref 27–31)
MCHC RBC AUTO-ENTMCNC: 34.2 G/DL (ref 33–36)
MCV RBC AUTO: 85.3 FL (ref 80–94)
MONOCYTES # BLD AUTO: 1.04 X10(3)/MCL (ref 0.1–1.3)
MONOCYTES NFR BLD AUTO: 6.7 %
NEUTROPHILS # BLD AUTO: 10.81 X10(3)/MCL (ref 2.1–9.2)
NEUTROPHILS NFR BLD AUTO: 69.4 %
NRBC BLD AUTO-RTO: 0 %
PLATELET # BLD AUTO: 412 X10(3)/MCL (ref 130–400)
PMV BLD AUTO: 9.2 FL (ref 7.4–10.4)
POTASSIUM SERPL-SCNC: 3.3 MMOL/L (ref 3.5–5.1)
PROT SERPL-MCNC: 6.7 GM/DL (ref 6.4–8.3)
RBC # BLD AUTO: 4.29 X10(6)/MCL (ref 4.2–5.4)
SODIUM SERPL-SCNC: 139 MMOL/L (ref 136–145)
WBC # BLD AUTO: 15.56 X10(3)/MCL (ref 4.5–11.5)

## 2024-08-07 PROCEDURE — 63600175 PHARM REV CODE 636 W HCPCS: Performed by: INTERNAL MEDICINE

## 2024-08-07 PROCEDURE — 25000003 PHARM REV CODE 250

## 2024-08-07 PROCEDURE — 99285 EMERGENCY DEPT VISIT HI MDM: CPT | Mod: 25

## 2024-08-07 PROCEDURE — 85652 RBC SED RATE AUTOMATED: CPT

## 2024-08-07 PROCEDURE — 83605 ASSAY OF LACTIC ACID: CPT

## 2024-08-07 PROCEDURE — 86141 C-REACTIVE PROTEIN HS: CPT

## 2024-08-07 PROCEDURE — 80053 COMPREHEN METABOLIC PANEL: CPT

## 2024-08-07 PROCEDURE — 81025 URINE PREGNANCY TEST: CPT

## 2024-08-07 PROCEDURE — 11000001 HC ACUTE MED/SURG PRIVATE ROOM

## 2024-08-07 PROCEDURE — 85025 COMPLETE CBC W/AUTO DIFF WBC: CPT

## 2024-08-07 PROCEDURE — 87040 BLOOD CULTURE FOR BACTERIA: CPT

## 2024-08-07 RX ORDER — SODIUM CHLORIDE 0.9 % (FLUSH) 0.9 %
10 SYRINGE (ML) INJECTION EVERY 12 HOURS PRN
Status: DISCONTINUED | OUTPATIENT
Start: 2024-08-07 | End: 2024-08-12 | Stop reason: HOSPADM

## 2024-08-07 RX ORDER — IBUPROFEN 200 MG
24 TABLET ORAL
Status: DISCONTINUED | OUTPATIENT
Start: 2024-08-07 | End: 2024-08-12 | Stop reason: HOSPADM

## 2024-08-07 RX ORDER — IBUPROFEN 200 MG
16 TABLET ORAL
Status: DISCONTINUED | OUTPATIENT
Start: 2024-08-07 | End: 2024-08-12 | Stop reason: HOSPADM

## 2024-08-07 RX ORDER — SODIUM CHLORIDE 9 MG/ML
INJECTION, SOLUTION INTRAVENOUS CONTINUOUS
Status: DISCONTINUED | OUTPATIENT
Start: 2024-08-07 | End: 2024-08-12 | Stop reason: HOSPADM

## 2024-08-07 RX ORDER — AMOXICILLIN 250 MG
1 CAPSULE ORAL 2 TIMES DAILY PRN
Status: DISCONTINUED | OUTPATIENT
Start: 2024-08-07 | End: 2024-08-12 | Stop reason: HOSPADM

## 2024-08-07 RX ORDER — GLUCAGON 1 MG
1 KIT INJECTION
Status: DISCONTINUED | OUTPATIENT
Start: 2024-08-07 | End: 2024-08-12 | Stop reason: HOSPADM

## 2024-08-07 RX ORDER — ONDANSETRON HYDROCHLORIDE 2 MG/ML
4 INJECTION, SOLUTION INTRAVENOUS EVERY 8 HOURS PRN
Status: DISCONTINUED | OUTPATIENT
Start: 2024-08-07 | End: 2024-08-12 | Stop reason: HOSPADM

## 2024-08-07 RX ORDER — HYDROCODONE BITARTRATE AND ACETAMINOPHEN 7.5; 325 MG/1; MG/1
1 TABLET ORAL
Status: COMPLETED | OUTPATIENT
Start: 2024-08-07 | End: 2024-08-07

## 2024-08-07 RX ORDER — MORPHINE SULFATE 4 MG/ML
2 INJECTION, SOLUTION INTRAMUSCULAR; INTRAVENOUS EVERY 4 HOURS PRN
Status: DISCONTINUED | OUTPATIENT
Start: 2024-08-07 | End: 2024-08-08

## 2024-08-07 RX ORDER — ACETAMINOPHEN 325 MG/1
650 TABLET ORAL EVERY 4 HOURS PRN
Status: DISCONTINUED | OUTPATIENT
Start: 2024-08-07 | End: 2024-08-12 | Stop reason: HOSPADM

## 2024-08-07 RX ORDER — NALOXONE HCL 0.4 MG/ML
0.02 VIAL (ML) INJECTION
Status: DISCONTINUED | OUTPATIENT
Start: 2024-08-07 | End: 2024-08-12 | Stop reason: HOSPADM

## 2024-08-07 RX ADMIN — HYDROCODONE BITARTRATE AND ACETAMINOPHEN 1 TABLET: 7.5; 325 TABLET ORAL at 05:08

## 2024-08-07 RX ADMIN — MORPHINE SULFATE 2 MG: 4 INJECTION INTRAVENOUS at 11:08

## 2024-08-07 NOTE — Clinical Note
Diagnosis: Acute pain of left knee [[id number]]   Future Attending Provider: AMAURI ZENDEJAS [[id number]]   Admit to which facility:: [facility] [[id number]]   Reason for IP Medical Treatment  (Clinical interventions that can only be accomplished in the IP setting? ) :: evaluation and treatment of L knee pain/swelling.   Plans for Post-Acute care--if anticipated (pick the single best option):: A. No post acute care anticipated at this time   Special Needs:: No Special Needs [1]

## 2024-08-07 NOTE — ED PROVIDER NOTES
Encounter Date: 8/7/2024       History     Chief Complaint   Patient presents with    Leg Pain     Pt c/o continued pain to left leg s/p fall 2 weeks ago. Hx total knee over one year ago.     39 y.o. White female presents to Emergency Department with a chief complaint of L knee pain. Symptoms began over 1 week ago and have been worsening since onset. Reports she fell down stairs and landed on her L knee. Was seen at [facility] after initial injury. Associated symptoms include L knee swelling. Symptoms are aggravated with palpation and exertion and there are no alleviating factors. The patient denies CP, SOB, additional injury, dizziness, vomiting, or abdominal pain. No other reported symptoms at this time      The history is provided by the patient. No  was used.   Leg Pain   The incident occurred at home. The injury mechanism was a fall. The incident occurred several days ago. The pain is present in the left knee. Pertinent negatives include no numbness, no loss of motion, no loss of sensation and no tingling. She reports no foreign bodies present. The symptoms are aggravated by palpation, bearing weight and activity.     Review of patient's allergies indicates:   Allergen Reactions    Benadryl allergy decongestant Shortness Of Breath    Diphenhydramine hcl     Sulfa (sulfonamide antibiotics) Swelling     Past Medical History:   Diagnosis Date    Cancer 2003    cervix    Hepatitis C      Past Surgical History:   Procedure Laterality Date    colonoscopy      ESOPHAGOGASTRODUODENOSCOPY      hemorhoidectomy      TONSILLECTOMY      TOTAL KNEE ARTHROPLASTY Left     TUBAL LIGATION       Family History   Problem Relation Name Age of Onset    Cancer Mother      Cancer Maternal Aunt       Social History     Tobacco Use    Smoking status: Every Day     Current packs/day: 0.50     Average packs/day: 0.5 packs/day for 13.0 years (6.5 ttl pk-yrs)     Types: Cigarettes, Vaping with nicotine    Smokeless tobacco:  Never   Substance Use Topics    Alcohol use: Not Currently     Alcohol/week: 2.0 standard drinks of alcohol     Types: 1 Glasses of wine, 1 Cans of beer per week    Drug use: Not Currently     Comment: 1.5 yrs clean     Review of Systems   Constitutional:  Negative for chills, fatigue and fever.   Eyes:  Negative for photophobia and visual disturbance.   Respiratory:  Negative for cough, shortness of breath, wheezing and stridor.    Cardiovascular:  Negative for chest pain and palpitations.   Gastrointestinal:  Negative for abdominal pain, nausea and vomiting.   Musculoskeletal:  Positive for arthralgias and joint swelling. Negative for back pain.   Skin:  Negative for wound.   Neurological:  Negative for tingling and numbness.   All other systems reviewed and are negative.      Physical Exam     Initial Vitals [08/07/24 1710]   BP Pulse Resp Temp SpO2   111/68 (!) 111 20 97.7 °F (36.5 °C) 98 %      MAP       --         Physical Exam    Nursing note and vitals reviewed.  Constitutional: She appears well-developed and well-nourished. She is not diaphoretic. She is cooperative.  Non-toxic appearance. No distress.   HENT:   Head: Normocephalic and atraumatic.   Right Ear: External ear normal.   Left Ear: External ear normal.   Nose: Nose normal.   Eyes: Conjunctivae and EOM are normal. Pupils are equal, round, and reactive to light.   Neck: Neck supple.   Normal range of motion.  Cardiovascular:  Normal rate, regular rhythm, intact distal pulses and normal pulses.           Pulmonary/Chest: Effort normal. No tachypnea and no bradypnea. No respiratory distress. She exhibits no tenderness.   Abdominal: There is no abdominal tenderness.   Musculoskeletal:         General: Tenderness and edema present.      Cervical back: Normal range of motion and neck supple.      Right knee: Normal.      Left knee: Swelling and erythema present. Decreased range of motion. Tenderness present. Normal pulse.      Comments: Tenderness,  moderate swelling, decreased ROM, and mild erythema noted to L knee. 3/5 ROM noted to L knee. CMS intact. No wounds noted. Previous incision scar noted from L arthroplasty. All other adjacent joints otherwise normal.        Neurological: She is alert and oriented to person, place, and time. She has normal strength. No sensory deficit. GCS score is 15. GCS eye subscore is 4. GCS verbal subscore is 5. GCS motor subscore is 6.   Skin: Skin is warm and dry. Capillary refill takes less than 2 seconds. There is erythema.   Psychiatric: She has a normal mood and affect. Thought content normal.               ED Course   Procedures  Labs Reviewed   COMPREHENSIVE METABOLIC PANEL - Abnormal       Result Value    Sodium 139      Potassium 3.3 (*)     Chloride 101      CO2 25      Glucose 104 (*)     Blood Urea Nitrogen 11.6      Creatinine 0.67      Calcium 9.2      Protein Total 6.7      Albumin 2.5 (*)     Globulin 4.2 (*)     Albumin/Globulin Ratio 0.6 (*)     Bilirubin Total 0.4            ALT 17      AST 38 (*)     eGFR >60      Anion Gap 13.0      BUN/Creatinine Ratio 17     SEDIMENTATION RATE, AUTOMATED - Abnormal    Sed Rate 64 (*)    CBC WITH DIFFERENTIAL - Abnormal    WBC 15.56 (*)     RBC 4.29      Hgb 12.5      Hct 36.6 (*)     MCV 85.3      MCH 29.1      MCHC 34.2      RDW 13.2      Platelet 412 (*)     MPV 9.2      Neut % 69.4      Lymph % 14.8      Mono % 6.7      Eos % 3.5      Basophil % 0.6      Lymph # 2.30      Neut # 10.81 (*)     Mono # 1.04      Eos # 0.54      Baso # 0.09      IG# 0.78 (*)     IG% 5.0      NRBC% 0.0     HIGH SENSITIVITY CRP - Abnormal    C-Reactive Protein High Sensitivity 115.72 (*)    PREGNANCY TEST, URINE RAPID - Normal    hCG Qualitative, Urine Negative     LACTIC ACID, PLASMA - Normal    Lactic Acid Level 2.0     BLOOD CULTURE OLG   BLOOD CULTURE OLG   CBC W/ AUTO DIFFERENTIAL    Narrative:     The following orders were created for panel order CBC auto  differential.  Procedure                               Abnormality         Status                     ---------                               -----------         ------                     CBC with Differential[[id number]]       Abnormal            Final result                 Please view results for these tests on the individual orders.          Imaging Results              CT Knee Without Contrast Left (Final result)  Result time 08/07/24 18:24:24      Final result by Master Cox MD (08/07/24 18:24:24)                   Impression:      No acute osseous findings identified.  Moderate knee effusion.      Electronically signed by: Master oCx  Date:    08/07/2024  Time:    18:24               Narrative:    EXAMINATION:  CT KNEE WITHOUT CONTRAST LEFT    CLINICAL HISTORY:  Knee replacement, loosening suspected;Knee replacement, periprosthetic fracture suspected;    TECHNIQUE:  Noncontrast CT imaging of the left knee.  Axial, coronal and sagittal reformatted images reviewed.   Dose length product is 226 mGycm. Automatic exposure control, adjustment of mA/kV or iterative reconstruction technique used to limit radiation dose.    COMPARISON:  Radiographs 07/29/2024    FINDINGS:  Previous total knee arthroplasty.  Hardware degrades image quality, but no defined acute fracture or dislocation identified.  Moderate knee effusion.  Prepatellar edema with additional subcutaneous fluid lateral to the proximal tibia.                                       Medications   HYDROcodone-acetaminophen 7.5-325 mg per tablet 1 tablet (1 tablet Oral Given 8/7/24 1740)     Medical Decision Making  Patient awake, alert, has non-labored breathing, and follows commands appropriately. Arrived to ED due to L knee pain that began after a fall several days ago. Afebrile. Denies additional injury. Has been wearing a knee immobilizer. NAD Noted.         Differential Diagnosis: Knee Pain, Fracture, Fall, Injury, Septic Joint     Amount and/or  Complexity of Data Reviewed  Labs: ordered. Decision-making details documented in ED Course.     Details: Leukocytosis noted. Inflammatory markers elevated. Informed patient of results.   Radiology: ordered. Decision-making details documented in ED Course.     Details: Informed patient of results.   Discussion of management or test interpretation with external provider(s): Due to presentation of patient's L knee, labs, and imaging results, will admit to  services with consult to ortho services. Based on labs symptoms likely infectious in nature. Discussed plan of care and interventions with patient. Agreed to and aware of plan of care. Comfortable being admitted.     Risk  Decision regarding hospitalization.               ED Course as of 08/07/24 2106   Wed Aug 07, 2024   1716 Patient seen at [facility] on 07/29/24 for L knee pain. Imaging: No acute osseous findings.  Possible small joint effusion.. Per note, MD thought pain was related to tendon or ligament issue. Instructed to f/u with ortho & prescribed [place]. Also, seen at [facility] ER on yesterday for same complaints.  [JA]   1827 CT Knee Without Contrast Left  Previous total knee arthroplasty.  Hardware degrades image quality, but no defined acute fracture or dislocation identified.  Moderate knee effusion.  Prepatellar edema with additional subcutaneous fluid lateral to the proximal tibia. [JA]   1838 Discussed patient with Dr. Stevens who assessed the patient at bedside. Will consult ortho services and add labs.  [JA]   1838 Ortho paged.  [JA]   1854 Discussed patient with Dr. Fleming, with ortho services. Unable to give recommendations at this time due to labs not being back. Will consult services again when labs work returns.  [JA]   1953 WBC(!): 15.56 [JA]   2003 Sed Rate(!): 64 [JA]   2027 Discussed patient with Dr. Fleming. Patient NPO at midnight, no antibiotics at this time, and admit to  services with consult to ortho. No further instruction or  recommendations given.  [JA]   2048  paged.  [JA]   2103 Discussed patient with Dr. Verma, with telemedicine services. Aware of plan of care and admission. Will admit to services. No further instruction or recommendations given.  [JA]      ED Course User Index  [JA] Tianna Drummond, NP                           Clinical Impression:  Final diagnoses:  [M25.562] Acute pain of left knee (Primary)  [M25.462] Effusion of left knee          ED Disposition Condition    Admit                 Tianna Drummond, NP  08/07/24 2100

## 2024-08-08 LAB
ABS NEUT CALC (OHS): 8.83 X10(3)/MCL (ref 2.1–9.2)
ANION GAP SERPL CALC-SCNC: 9 MEQ/L
BUN SERPL-MCNC: 8.6 MG/DL (ref 7–18.7)
CALCIUM SERPL-MCNC: 8.8 MG/DL (ref 8.4–10.2)
CHLORIDE SERPL-SCNC: 105 MMOL/L (ref 98–107)
CLARITY BODY FLUID (OLG): NORMAL
CO2 SERPL-SCNC: 27 MMOL/L (ref 22–29)
COLOR BODY FLUID (OLG): YELLOW
CREAT SERPL-MCNC: 0.63 MG/DL (ref 0.55–1.02)
CREAT/UREA NIT SERPL: 14
ERYTHROCYTE [DISTWIDTH] IN BLOOD BY AUTOMATED COUNT: 13.2 % (ref 11.5–17)
GFR SERPLBLD CREATININE-BSD FMLA CKD-EPI: >60 ML/MIN/1.73/M2
GLUCOSE SERPL-MCNC: 86 MG/DL (ref 74–100)
GRAM STN SPEC: NORMAL
GRAM STN SPEC: NORMAL
HCT VFR BLD AUTO: 36.3 % (ref 37–47)
HGB BLD-MCNC: 12.6 G/DL (ref 12–16)
LYMPHOCYTES NFR BLD MANUAL: 2.39 X10(3)/MCL
LYMPHOCYTES NFR BLD MANUAL: 20 % (ref 13–40)
MAGNESIUM SERPL-MCNC: 2 MG/DL (ref 1.6–2.6)
MCH RBC QN AUTO: 29.5 PG (ref 27–31)
MCHC RBC AUTO-ENTMCNC: 34.7 G/DL (ref 33–36)
MCV RBC AUTO: 85 FL (ref 80–94)
MONOCYTES NFR BLD MANUAL: 0.48 X10(3)/MCL (ref 0.1–1.3)
MONOCYTES NFR BLD MANUAL: 4 % (ref 2–11)
MONONUCLEAR % BF (OLG): 27 %
NEUTROPHILS NFR BLD MANUAL: 74 % (ref 47–80)
NRBC BLD AUTO-RTO: 0 %
PLATELET # BLD AUTO: 415 X10(3)/MCL (ref 130–400)
PLATELET # BLD EST: ABNORMAL 10*3/UL
PMV BLD AUTO: 9.1 FL (ref 7.4–10.4)
POLYMORPHONUCLEAR % BF (OLG): 73 %
POTASSIUM SERPL-SCNC: 3.3 MMOL/L (ref 3.5–5.1)
PROMYELOCYTES # BLD MANUAL: 2 %
RBC # BLD AUTO: 4.27 X10(6)/MCL (ref 4.2–5.4)
RBC BODY FLUID (OLG): NORMAL /UL
RBC MORPH BLD: NORMAL
SODIUM SERPL-SCNC: 141 MMOL/L (ref 136–145)
WBC # BLD AUTO: 11.93 X10(3)/MCL (ref 4.5–11.5)
WBC # FLD AUTO: NORMAL /UL

## 2024-08-08 PROCEDURE — 83735 ASSAY OF MAGNESIUM: CPT | Performed by: INTERNAL MEDICINE

## 2024-08-08 PROCEDURE — 11000001 HC ACUTE MED/SURG PRIVATE ROOM

## 2024-08-08 PROCEDURE — 87205 SMEAR GRAM STAIN: CPT | Performed by: NURSE PRACTITIONER

## 2024-08-08 PROCEDURE — 80048 BASIC METABOLIC PNL TOTAL CA: CPT | Performed by: INTERNAL MEDICINE

## 2024-08-08 PROCEDURE — 89060 EXAM SYNOVIAL FLUID CRYSTALS: CPT | Performed by: NURSE PRACTITIONER

## 2024-08-08 PROCEDURE — 63600175 PHARM REV CODE 636 W HCPCS: Performed by: INTERNAL MEDICINE

## 2024-08-08 PROCEDURE — 25000003 PHARM REV CODE 250: Performed by: INTERNAL MEDICINE

## 2024-08-08 PROCEDURE — 85027 COMPLETE CBC AUTOMATED: CPT | Performed by: INTERNAL MEDICINE

## 2024-08-08 PROCEDURE — 89051 BODY FLUID CELL COUNT: CPT | Performed by: NURSE PRACTITIONER

## 2024-08-08 PROCEDURE — 94761 N-INVAS EAR/PLS OXIMETRY MLT: CPT

## 2024-08-08 PROCEDURE — 87206 SMEAR FLUORESCENT/ACID STAI: CPT | Performed by: NURSE PRACTITIONER

## 2024-08-08 PROCEDURE — 87075 CULTR BACTERIA EXCEPT BLOOD: CPT | Performed by: NURSE PRACTITIONER

## 2024-08-08 PROCEDURE — 20610 DRAIN/INJ JOINT/BURSA W/O US: CPT | Mod: LT,,, | Performed by: REHABILITATION UNIT

## 2024-08-08 PROCEDURE — 36415 COLL VENOUS BLD VENIPUNCTURE: CPT | Performed by: INTERNAL MEDICINE

## 2024-08-08 PROCEDURE — 99900031 HC PATIENT EDUCATION (STAT)

## 2024-08-08 PROCEDURE — 87077 CULTURE AEROBIC IDENTIFY: CPT | Performed by: NURSE PRACTITIONER

## 2024-08-08 PROCEDURE — 99223 1ST HOSP IP/OBS HIGH 75: CPT | Mod: 25,,, | Performed by: REHABILITATION UNIT

## 2024-08-08 PROCEDURE — 25000003 PHARM REV CODE 250: Performed by: NURSE PRACTITIONER

## 2024-08-08 RX ORDER — HYDROCODONE BITARTRATE AND ACETAMINOPHEN 5; 325 MG/1; MG/1
1 TABLET ORAL EVERY 4 HOURS PRN
Status: DISCONTINUED | OUTPATIENT
Start: 2024-08-08 | End: 2024-08-12 | Stop reason: HOSPADM

## 2024-08-08 RX ORDER — POTASSIUM CHLORIDE 20 MEQ/1
20 TABLET, EXTENDED RELEASE ORAL ONCE
Status: COMPLETED | OUTPATIENT
Start: 2024-08-08 | End: 2024-08-08

## 2024-08-08 RX ORDER — ENOXAPARIN SODIUM 100 MG/ML
40 INJECTION SUBCUTANEOUS ONCE
Status: COMPLETED | OUTPATIENT
Start: 2024-08-08 | End: 2024-08-08

## 2024-08-08 RX ORDER — METHOCARBAMOL 750 MG/1
750 TABLET, FILM COATED ORAL EVERY 6 HOURS PRN
Status: DISCONTINUED | OUTPATIENT
Start: 2024-08-08 | End: 2024-08-12 | Stop reason: HOSPADM

## 2024-08-08 RX ORDER — HYDROCODONE BITARTRATE AND ACETAMINOPHEN 7.5; 325 MG/1; MG/1
1 TABLET ORAL EVERY 4 HOURS PRN
Status: DISCONTINUED | OUTPATIENT
Start: 2024-08-08 | End: 2024-08-12 | Stop reason: HOSPADM

## 2024-08-08 RX ORDER — MORPHINE SULFATE 4 MG/ML
4 INJECTION, SOLUTION INTRAMUSCULAR; INTRAVENOUS EVERY 4 HOURS PRN
Status: DISCONTINUED | OUTPATIENT
Start: 2024-08-08 | End: 2024-08-12 | Stop reason: HOSPADM

## 2024-08-08 RX ADMIN — CEFEPIME 2 G: 2 INJECTION, POWDER, FOR SOLUTION INTRAVENOUS at 06:08

## 2024-08-08 RX ADMIN — MORPHINE SULFATE 2 MG: 4 INJECTION INTRAVENOUS at 08:08

## 2024-08-08 RX ADMIN — MORPHINE SULFATE 2 MG: 4 INJECTION INTRAVENOUS at 04:08

## 2024-08-08 RX ADMIN — ENOXAPARIN SODIUM 40 MG: 40 INJECTION SUBCUTANEOUS at 06:08

## 2024-08-08 RX ADMIN — SODIUM CHLORIDE: 9 INJECTION, SOLUTION INTRAVENOUS at 12:08

## 2024-08-08 RX ADMIN — HYDROCODONE BITARTRATE AND ACETAMINOPHEN 1 TABLET: 5; 325 TABLET ORAL at 12:08

## 2024-08-08 RX ADMIN — POTASSIUM CHLORIDE 20 MEQ: 1500 TABLET, EXTENDED RELEASE ORAL at 06:08

## 2024-08-08 RX ADMIN — VANCOMYCIN HYDROCHLORIDE 1500 MG: 1.5 INJECTION, POWDER, LYOPHILIZED, FOR SOLUTION INTRAVENOUS at 01:08

## 2024-08-08 RX ADMIN — VANCOMYCIN HYDROCHLORIDE 1000 MG: 1 INJECTION, POWDER, LYOPHILIZED, FOR SOLUTION INTRAVENOUS at 08:08

## 2024-08-08 RX ADMIN — HYDROCODONE BITARTRATE AND ACETAMINOPHEN 1 TABLET: 5; 325 TABLET ORAL at 06:08

## 2024-08-08 NOTE — CONSULTS
[facility] - [facility]ics  Orthopedic Surgery  Consult Note    Patient Name: Jeanie Wong  MRN: [id number]  Admission Date: 8/7/2024  Hospital Length of Stay: 1 days  Attending Provider: Kuldeep Reyes MD  Primary Care Provider: Suzy Primary Doctor    Inpatient consult to Orthopedic Surgery  Consult performed by: Leif Fleming MD  Consult ordered by: Lucy Cornejo MD        Subjective:     Chief Complaint:   Chief Complaint   Patient presents with    Leg Pain     Pt c/o continued pain to left leg s/p fall 2 weeks ago. Hx total knee over one year ago.      HPI:   39F s/p L TKA around 1 year ago who presented to the emergency department with increased pain and swelling.  Total knee arthroplasty was done by Dr. Pereira around 1-1/2 years ago.  She reports that she did well postoperatively with no issues until she fell around 2 weeks ago.  She went to the emergency department at that time.  Radiographs were negative for acute injury.  She was placed into a knee immobilizer.  She had persistent pain and swelling that progressively worsened.  She came back to the emergency department.  Inflammatory labs were concerning for an infection.  CT scan showed an effusion.  She was admitted to the hospitalist service and orthopedics was consulted for further evaluation and treatment.    Past Medical History:   Diagnosis Date    Cancer 2003    cervix    Hepatitis C        Past Surgical History:   Procedure Laterality Date    colonoscopy      ESOPHAGOGASTRODUODENOSCOPY      hemorhoidectomy      TONSILLECTOMY      TOTAL KNEE ARTHROPLASTY Left     TUBAL LIGATION         Review of patient's allergies indicates:   Allergen Reactions    Benadryl allergy decongestant Shortness Of Breath    Diphenhydramine hcl     Sulfa (sulfonamide antibiotics) Swelling       Current Facility-Administered Medications   Medication    0.9%  NaCl infusion    acetaminophen tablet 650 mg    dextrose 10% bolus 125 mL 125 mL     "dextrose 10% bolus 250 mL 250 mL    glucagon (human recombinant) injection 1 mg    glucose chewable tablet 16 g    glucose chewable tablet 24 g    morphine injection 2 mg    naloxone 0.4 mg/mL injection 0.02 mg    ondansetron injection 4 mg    senna-docusate 8.6-50 mg per tablet 1 tablet    sodium chloride 0.9% flush 10 mL     Family History       Problem Relation (Age of Onset)    Cancer Mother, Maternal Aunt          Tobacco Use    Smoking status: Every Day     Current packs/day: 0.50     Average packs/day: 0.5 packs/day for 13.0 years (6.5 ttl pk-yrs)     Types: Cigarettes, Vaping with nicotine    Smokeless tobacco: Never   Substance and Sexual Activity    Alcohol use: Not Currently     Alcohol/week: 2.0 standard drinks of alcohol     Types: 1 Glasses of wine, 1 Cans of beer per week    Drug use: Not Currently     Comment: 1.5 yrs clean    Sexual activity: Yes     Partners: Male     Birth control/protection: None       ROS:  Constitutional: Denies fever chills  Eyes: No change in vision  ENT: No ringing or current infections  CV: No chest pain  Resp: No labored breathing  MSK: Pain evident at site of injury located in HPI,   Integ: No signs of abrasions or lacerations  Neuro: No numbness or tingling  Lymphatic: No swelling outside the area of injury   Objective:     Vital Signs (Most Recent):  Temp: 98.3 °F (36.8 °C) (08/08/24 0708)  Pulse: 88 (08/08/24 0708)  Resp: 17 (08/08/24 0847)  BP: 120/82 (08/08/24 0708)  SpO2: 98 % (08/08/24 0708) Vital Signs (24h Range):  Temp:  [97.7 °F (36.5 °C)-99 °F (37.2 °C)] 98.3 °F (36.8 °C)  Pulse:  [] 88  Resp:  [16-20] 17  SpO2:  [96 %-99 %] 98 %  BP: (104-120)/(65-82) 120/82     Weight: 68 kg (150 lb)  Height: 5' 4" (162.6 cm)  Body mass index is 25.75 kg/m².    No intake or output data in the 24 hours ending 08/08/24 0935    General the patient is alert and oriented x3 no acute distress nontoxic-appearing appropriate affect.    Constitutional: Vital signs are examined " and stable.  Resp: No signs of labored breathing  CV: Well-perfused  Abdomen: Nondistended    LLE: -Skin:  Well-healed anterior knee midline incision with minimal erythema distally.  There is a moderate large effusion to the knee.  Limited and painful range of motion.  Exquisitely tender diffusely.  She has some edema to the lower leg as well.          -MSK: F/E, EHL/FHL, Gastroc/Tib anterior Strength 3/5           -Neuro:  Sensation intact to light touch L3-S1 dermatomes           -CV: Capillary refill is less than 2 seconds. DP/PT pulses 2/4. Compartments soft and compressible                      Significant Labs: BMP:   Recent Labs   Lab 08/08/24  0453      K 3.3*      CO2 27   BUN 8.6   CREATININE 0.63   CALCIUM 8.8   MG 2.00     CBC:   Recent Labs   Lab 08/07/24 1930 08/08/24  0453   WBC 15.56* 11.93*   HGB 12.5 12.6   HCT 36.6* 36.3*   * 415*     .72  ESR 64    All pertinent labs within the past 24 hours have been reviewed.  Recent Lab Results         08/08/24  0453   08/07/24 1930 08/07/24  1747        Albumin/Globulin Ratio   0.6         Neutrophils Abs Calc 8.8282           Albumin   2.5         ALP   127         ALT   17         Anion Gap 9.0   13.0         AST   38         Baso #   0.09         Basophil %   0.6         BILIRUBIN TOTAL   0.4         BUN 8.6   11.6         BUN/CREAT RATIO 14   17         Calcium 8.8   9.2         Chloride 105   101         CO2 27   25         Creatinine 0.63   0.67         CRP, High Sensitivity   115.72         eGFR >60   >60         Eos #   0.54         Eos %   3.5         Globulin, Total   4.2         Glucose 86   104         Hematocrit 36.3   36.6         Hemoglobin 12.6   12.5         Immature Grans (Abs)   0.78         Immature Granulocytes   5.0         Lactic Acid Level   2.0         Lymph # 2.386   2.30         LYMPH %   14.8         Lymphocyte % 20           Magnesium  2.00           MCH 29.5   29.1         MCHC 34.7   34.2          MCV 85.0   85.3         Mono # 0.4772   1.04         Mono % 4   6.7         MPV 9.1   9.2         Neut #   10.81         Neut %   69.4         Neutrophils Relative 74           nRBC 0.0   0.0         Platelet Estimate Increased           Platelet Count 415   412         Potassium 3.3   3.3         hCG Qualitative, Urine     Negative       Promyelocytes 2           PROTEIN TOTAL   6.7         RBC 4.27   4.29         RBC Morph Normal           RDW 13.2   13.2         Sed Rate   64         Sodium 141   139         WBC 11.93   15.56                  Significant Imaging: I have reviewed all pertinent imaging results/findings.    Previously obtained radiographs and CT scan reviewed showing total knee arthroplasty components in appropriate position with no signs of loosening or failure.  There is an effusion.    CT Knee Without Contrast Left    Result Date: 8/7/2024  EXAMINATION: CT KNEE WITHOUT CONTRAST LEFT CLINICAL HISTORY: Knee replacement, loosening suspected;Knee replacement, periprosthetic fracture suspected; TECHNIQUE: Noncontrast CT imaging of the left knee.  Axial, coronal and sagittal reformatted images reviewed.   Dose length product is 226 mGycm. Automatic exposure control, adjustment of mA/kV or iterative reconstruction technique used to limit radiation dose. COMPARISON: Radiographs 07/29/2024 FINDINGS: Previous total knee arthroplasty.  Hardware degrades image quality, but no defined acute fracture or dislocation identified.  Moderate knee effusion.  Prepatellar edema with additional subcutaneous fluid lateral to the proximal tibia.     No acute osseous findings identified.  Moderate knee effusion. Electronically signed by: Master Cox Date:    08/07/2024 Time:    18:24    X-Ray Knee Complete 4 or More Views Left    Result Date: 7/29/2024  EXAMINATION: XR KNEE COMP 4 OR MORE VIEWS LEFT CLINICAL HISTORY: Unspecified fall, initial encounter COMPARISON: 3 May 2023 FINDINGS: Four views of the left knee.  There  is no fracture or dislocation.  There is arthroplasty without evidence of loosening.  There may be a small suprapatellar joint effusion.     No acute osseous findings.  Possible small joint effusion. Electronically signed by: Dawson Urbina Date:    07/29/2024 Time:    10:08     Procedure:  Left knee aspiration  Left knee was sterilely prepped and draped.  Verbal consent was obtained.  Time-out was completed.  Lateral approach to the knee was used.  18 gauge needle was inserted and approximally 100 cc of cloudy and purulent appearing fluid was aspirated.  Patient tolerated well.  Dressings were applied.  Fluid was sent for analysis.    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  Clinically concerning for infection.  Aspiration was completed as above.  Follow up fluid studies.  Start broad-spectrum IV antibiotics.  Mobilize as able.  DVT prophylaxis.  I have contacted the arthroplasty team for further evaluation and management.    Leif Fleming MD  Orthopedic Surgery  [facility]

## 2024-08-08 NOTE — NURSING
Nurses Note -- 4 Eyes      8/8/2024   6:07 PM      Skin assessed during: Admit      [x] No Altered Skin Integrity Present    [x]Prevention Measures Documented      [] Yes- Altered Skin Integrity Present or Discovered   [] LDA Added if Not in Epic (Describe Wound)   [] New Altered Skin Integrity was Present on Admit and Documented in LDA   [] Wound Image Taken    Wound Care Consulted? No    Attending Nurse:  Mayelin Garza RN/Staff Member:   antoine babb

## 2024-08-08 NOTE — PROGRESS NOTES
"Pharmacokinetic Initial Assessment: IV Vancomycin    Assessment/Plan:    Initiate intravenous vancomycin with loading dose of 1500 mg once followed by a maintenance dose of vancomycin 1000mg IV every 12 hours  Desired empiric serum trough concentration is 15 to 20 mcg/mL  Draw vancomycin trough level 60 min prior to fourth dose on 08/09 at approximately 2000  Pharmacy will continue to follow and monitor vancomycin.      Please contact pharmacy at extension [phone] with any questions regarding this assessment.     Thank you for the consult,   Jamieluis Holguind       Patient brief summary:  Jeanie Wong is a 39 y.o. female initiated on antimicrobial therapy with IV Vancomycin for treatment of suspected bone/joint infection    Drug Allergies:   Review of patient's allergies indicates:   Allergen Reactions    Benadryl allergy decongestant Shortness Of Breath    Diphenhydramine hcl     Sulfa (sulfonamide antibiotics) Swelling       Actual Body Weight:   68 kg    Renal Function:   Estimated Creatinine Clearance: 112.4 mL/min (based on SCr of 0.63 mg/dL).,     Dialysis Method (if applicable):  N/A    CBC (last 72 hours):  Recent Labs   Lab Result Units 08/07/24 1930 08/08/24  0453   WBC x10(3)/mcL 15.56* 11.93*   Hgb g/dL 12.5 12.6   Hct % 36.6* 36.3*   Platelet x10(3)/mcL 412* 415*   Mono % % 6.7  --    Monocytes % %  --  4   Eos % % 3.5  --    Basophil % % 0.6  --        Metabolic Panel (last 72 hours):  Recent Labs   Lab Result Units 08/07/24 1930 08/08/24  0453   Sodium mmol/L 139 141   Potassium mmol/L 3.3* 3.3*   Chloride mmol/L 101 105   CO2 mmol/L 25 27   Glucose mg/dL 104* 86   Blood Urea Nitrogen mg/dL 11.6 8.6   Creatinine mg/dL 0.67 0.63   Albumin g/dL 2.5*  --    Bilirubin Total mg/dL 0.4  --    ALP unit/L 127  --    AST unit/L 38*  --    ALT unit/L 17  --    Magnesium Level mg/dL  --  2.00       Drug levels (last 3 results):  No results for input(s): "VANCOMYCINRA", "VANCORANDOM", "VANCOMYCINPE", "VANCOPEAK", " ""VANCOMYCINTR", "VANCOTROUGH" in the last 72 hours.    Microbiologic Results:  Microbiology Results (last 7 days)       Procedure Component Value Units Date/Time    Body Fluid Culture [[id number]] Collected: 08/08/24 0934    Order Status: Sent Specimen: Body Fluid from Knee, Left Updated: 08/08/24 0948    Gram Stain [[id number]] Collected: 08/08/24 0934    Order Status: Sent Specimen: Body Fluid from Knee, Left Updated: 08/08/24 0948    Anaerobic Culture [[id number]] Collected: 08/08/24 0934    Order Status: Sent Specimen: Body Fluid from Knee, Left Updated: 08/08/24 0948    AFB Smear [[id number]] Collected: 08/08/24 0934    Order Status: Sent Specimen: Synovial Fluid from Knee, Left Updated: 08/08/24 0948    Blood Culture #1 **CANNOT BE ORDERED STAT** [[id number]] Collected: 08/07/24 1938    Order Status: Resulted Specimen: Blood from Forearm, Left Updated: 08/07/24 1944    Blood Culture #2 **CANNOT BE ORDERED STAT** [[id number]] Collected: 08/07/24 1930    Order Status: Resulted Specimen: Blood from Antecubital, Left Updated: 08/07/24 1943            "

## 2024-08-08 NOTE — PLAN OF CARE
08/08/24 1059   Discharge Assessment   Assessment Type Discharge Planning Assessment   Source of Information patient;health record   Communicated NAIN with patient/caregiver Yes   Reason For Admission s/p KNEE EFFUSION   People in Home child(erwin), dependent   Do you expect to return to your current living situation? Yes   Do you have help at home or someone to help you manage your care at home? Yes   Who are your caregiver(s) and their phone number(s)? SCHOOL AGE DAUGHTERS - TRACY & JENNY   Prior to hospitilization cognitive status: Alert/Oriented   Current cognitive status: Alert/Oriented   Walking or Climbing Stairs Difficulty yes   Walking or Climbing Stairs ambulation difficulty, requires equipment;ambulation difficulty, assistance 1 person   Dressing/Bathing Difficulty yes   Dressing/Bathing bathing difficulty, requires equipment;dressing difficulty, requires equipment   Equipment Currently Used at Home crutches   Readmission within 30 days? No   Patient currently being followed by outpatient case management? No   Do you currently have service(s) that help you manage your care at home? No   Do you take prescription medications? Yes   Do you have prescription coverage? Yes   Do you have any problems affording any of your prescribed medications? No   Is the patient taking medications as prescribed? yes   Who is going to help you get home at discharge? DAUGHTER - TRACY   How do you get to doctors appointments? car, drives self   Are you on dialysis? No   Do you take coumadin? No   Discharge Plan A Home with family;Home Health   Discharge Plan B Home with family   DME Needed Upon Discharge  crutches   Discharge Plan discussed with: Patient   Transition of Care Barriers Mobility     Admitted w knee effusion r/t fall. ? Septic knee.  Hx TKR. Spk w pt--C/o severe pain; crying. Dr Fleming aspirating fluid from knee. Await cultures.   Pt std she lives with 2 daughters ( both in school)-- Tracy & Jenny. Both able  to asst w pt recovery- when not in school.     Will f/u.      Pcp: new pt appt.

## 2024-08-08 NOTE — PROGRESS NOTES
[facility]  [facility] Medicine Progress Note      Patient Name: Jeanie Wong  MRN: [id number]  Admission Date: 8/7/2024   Length of Stay: 1  Attending Physician: Kuldeep Reyes MD  Primary Care Provider: Suzy Primary Doctor  Face-to-Face encounter date: 08/08/2024    Code Status: Full Code        Chief Complaint:   Leg Pain (Pt c/o continued pain to left leg s/p fall 2 weeks ago. Hx total knee over one year ago.)        HPI:   Patient is a 39-year-old woman with history of hepatitis C, asthma, nicotine dependence, daily vaping, left knee replacement who had arrived at the emergency room with complaints of worsening left knee pain.     One and half weeks ago patient had fallen while walking down the stairs landed on her left knee where she had previously had a knee replacement.  Initially it bothered her for about 6 hours and was unable to walk.  She had gone to [facility] Emergency room at which time she had been sent home with a brace.  She had been using the narcotic pain medications that she has been given but had been having difficulty with worsening pain, swelling and redness of that specific knee.  She had recently run out of the pain medications and was no longer able to control her pain follow-up and walk on her leg without the crutches.     Patient denies any fevers or chills.  Patient denies any nausea or vomiting.  Patient does admit to some issues with mild constipation but denies any issues of diarrhea.     Initial vital signs in the emergency room temperature of 97.7 °F, 111 bpm, 20 respirations a minute, 111/68 mmHg 98% on room air.  Patient was 68 kg.     Review of patient's laboratory studies patient has white blood cell count of 15.5, hemoglobin of 12.5, MCV of 85.3 with a platelet count of 412.  Neutrophil predominance of 69.4%.     ESR is 64, high-sensitivity CRP is 115.72.  Lactic acid 2.0.     Patient sodium is 139, potassium 3.3,  chloride 101, carbon dioxide 25, BUN/creatinine 11.6/0.67 glucose of 104.  Calcium of 9.2.  Total protein/albumin 6.7/2.5.  LFTs unremarkable and noncontributory.     CT of the knee without contrast on the left shows no acute osseous findings but does show evidence of moderate knee effusion.     Patient admitted to the hospital for concerns of septic joint.  However, after initial review of patient's laboratory studies and clinical case orthopedics will like to hold off on IV antibiotics for now until arthrocentesis can be done in the morning.    Overview/Hospital Course:  No notes on file       Interval Hx:   Arthrocentesis done this am, no f/c.  +left knee pain    Review of Systems   All other systems reviewed and are negative.      Objective/physical exam:  General: In no acute distress, afebrile  Chest: Clear to auscultation bilaterally  Heart: RRR, +S1, S2, no appreciable murmur  Abdomen: Soft, nontender, BS +  MSK: left knee swelling, tender, warm, erythema  Neurologic: Alert and oriented x4, Cranial nerve II-XII intact     VITAL SIGNS: 24 HRS MIN & MAX LAST   Temp  Min: 97.7 °F (36.5 °C)  Max: 99 °F (37.2 °C) 97.8 °F (36.6 °C)   BP  Min: 104/66  Max: 120/82 111/74   Pulse  Min: 74  Max: 111  74   Resp  Min: 16  Max: 20 17   SpO2  Min: 96 %  Max: 99 % 99 %       Recent Labs   Lab 08/07/24 1930 08/08/24  0453   WBC 15.56* 11.93*   RBC 4.29 4.27   HGB 12.5 12.6   HCT 36.6* 36.3*   MCV 85.3 85.0   MCH 29.1 29.5   MCHC 34.2 34.7   RDW 13.2 13.2   * 415*   MPV 9.2 9.1       Recent Labs   Lab 08/07/24 1930 08/08/24  0453    141   K 3.3* 3.3*    105   CO2 25 27   BUN 11.6 8.6   CREATININE 0.67 0.63   CALCIUM 9.2 8.8   MG  --  2.00   ALBUMIN 2.5*  --    ALKPHOS 127  --    ALT 17  --    AST 38*  --    BILITOT 0.4  --         Microbiology Results (last 7 days)       Procedure Component Value Units Date/Time    Gram Stain [[id number]] Collected: 08/08/24 0934    Order Status: Completed Specimen: Body  Fluid from Knee, Left Updated: 08/08/24 1434     GRAM STAIN Many WBC observed      No bacteria seen    Body Fluid Culture [[id number]] Collected: 08/08/24 0934    Order Status: Resulted Specimen: Body Fluid from Knee, Left Updated: 08/08/24 0948    Anaerobic Culture [[id number]] Collected: 08/08/24 0934    Order Status: Sent Specimen: Body Fluid from Knee, Left Updated: 08/08/24 0948    AFB Smear [[id number]] Collected: 08/08/24 0934    Order Status: Sent Specimen: Synovial Fluid from Knee, Left Updated: 08/08/24 0948    Blood Culture #1 **CANNOT BE ORDERED STAT** [[id number]] Collected: 08/07/24 1938    Order Status: Resulted Specimen: Blood from Forearm, Left Updated: 08/07/24 1944    Blood Culture #2 **CANNOT BE ORDERED STAT** [[id number]] Collected: 08/07/24 1930    Order Status: Resulted Specimen: Blood from Antecubital, Left Updated: 08/07/24 1943             Radiology:  US Lower Extremity Veins Bilateral  EXAMINATION  US LOWER EXTREMITY VEINS BILATERAL    CLINICAL HISTORY  Rule out DVT, bilateral;    TECHNIQUE  Multiplanar grayscale sonographic evaluation of the bilateral lower extremity deep venous structures, with color/spectral Doppler interrogation compressive techniques to evaluate vessel patency.    COMPARISON  None available at the time of initial interpretation.    FINDINGS  Exam quality: adequate for evaluation    The interrogated bilateral lower extremity deep venous structures demonstrate unremarkable gray scale morphology, compressibility, and spontaneous color flow.  Additionally, normal spectral waveforms are documented, with unremarkable augmentation and respiratory variation.    No focal abnormalities of the visualized nonvascular tissues are identified.    IMPRESSION  No sonographic evidence of deep vein thrombosis.    Electronically signed by: Eugene Koo  Date:    08/08/2024  Time:    11:24      Scheduled Med:   piperacillin-tazobactam (Zosyn) IV (PEDS and ADULTS) (extended infusion is not  appropriate)  4.5 g Intravenous Q8H    vancomycin (VANCOCIN) IV (PEDS and ADULTS)  1,000 mg Intravenous Q12H        Continuous Infusions:   0.9% NaCl   Intravenous Continuous 125 mL/hr at 08/08/24 0028 New Bag at 08/08/24 0028        PRN Meds:    Current Facility-Administered Medications:     acetaminophen, 650 mg, Oral, Q4H PRN    dextrose 10%, 12.5 g, Intravenous, PRN    dextrose 10%, 25 g, Intravenous, PRN    glucagon (human recombinant), 1 mg, Intramuscular, PRN    glucose, 16 g, Oral, PRN    glucose, 24 g, Oral, PRN    HYDROcodone-acetaminophen, 1 tablet, Oral, Q4H PRN    HYDROcodone-acetaminophen, 1 tablet, Oral, Q4H PRN    methocarbamoL, 750 mg, Oral, Q6H PRN    morphine, 4 mg, Intravenous, Q4H PRN    naloxone, 0.02 mg, Intravenous, PRN    ondansetron, 4 mg, Intravenous, Q8H PRN    senna-docusate 8.6-50 mg, 1 tablet, Oral, BID PRN    sodium chloride 0.9%, 10 mL, Intravenous, Q12H PRN     Nutrition Status:      Assessment/Plan:  Left knee suspected prosthetic joint infection  Left knee pain  Hx left knee replacement  Hypokalemia    Plan    Aspirated this am  F/u cxs  Vancomycin/cefepime  Labs in am  Replace k    Dvt proph: lovenox          All diagnosis and differential diagnosis have been reviewed; assessment and plan has been documented; I have personally reviewed the labs and test results that are presently available; I have reviewed the patients medication list; I have reviewed the consulting providers response and recommendations. I have reviewed or attempted to review medical records based upon their availability      _____________________________________________________________________            Kuldeep Reyes MD   08/08/2024

## 2024-08-08 NOTE — ED PROVIDER NOTES
Patient seen and examined.  She presents with left knee pain, redness and swelling.  She is roughly a year out from lef TKA done by Dr. Pereira in [place].  She has a past history of IV drug use.  Exam reveals erythema, warmth and tenderness over the anterior knee with a palpable effusion and tenderness.  She exhibits decreased ROM due to pain.  She is also very anxious.  WBC, ESR, and CRP all significantly elevated.  Case discussed with ortho on call (Dr. Fleming) who recommends admission to medicine service, hold off on antibiotics and keep NPO  after MN for I&D in the AM.    I am in agreement with Mrs. Drummond's documentation and plan of care and I assume responsibility for said plan.     Brad Olmedo MD  08/07/24 5677

## 2024-08-08 NOTE — H&P
Active Problem List:  -Left knee joint effusion  -Intractable Pain of the Left Knee  -Nicotine dependence  - H/o Left Knee Replacement.     Medical Decision Making 08/07/2024:  -Patient is being admitted to the hospital for concerns of septic joint of the left knee.  However, given clinical history, presentation in the emergency room and current laboratory studies orthopedics would like to hold off on IV antibiotics for now.  An arthrocentesis will be done in the morning.  If it shows evidence of an infectious process patient will need IV antibiotics and surgical washout of the joint    -For now, pain control and IV maintenance fluids.    Diet: NPO   DVT Prophy: SCDs  Disposition: Patient is likely to be hospitalized for greater than 48 hours but less than 96 hours    This encounter was completed via telemedicine (audio/video) with nursing at bedside to assist with clinical exam. SOC Audio/Visual Equipment is using HIPAA compliant web platform.    Participants on call: Bedside RN, patient, physician on-call    Patient Location: [place]  Provider Location: [place]  Physician on call: Bora Cornejo MD  Seen in emergency room awaiting bed placement: No  Status of patient: Inpatient    Patient aware of remote access and use of Telemedicine and agrees to continue with care.     Bora Cornejo MD  Internal Medicine Hospitalist  Date of service: 08/07/2024    ===========================================  Chief complaint: Left knee pain  Allergies: Sulfa, Benadryl  CODE STATUS: Full Code    History:  Patient is a 39-year-old woman with history of hepatitis C, asthma, nicotine dependence, daily vaping, left knee replacement who had arrived at the emergency room with complaints of worsening left knee pain.    One and half weeks ago patient had fallen while walking down the stairs landed on her left knee where she had previously had a knee replacement.  Initially it bothered her for about 6 hours and was unable to walk.   "She had gone to [facility] Emergency room at which time she had been sent home with a brace.  She had been using the narcotic pain medications that she has been given but had been having difficulty with worsening pain, swelling and redness of that specific knee.  She had recently run out of the pain medications and was no longer able to control her pain follow-up and walk on her leg without the crutches.    Patient denies any fevers or chills.  Patient denies any nausea or vomiting.  Patient does admit to some issues with mild constipation but denies any issues of diarrhea.    Initial vital signs in the emergency room temperature of 97.7 °F, 111 bpm, 20 respirations a minute, 111/68 mmHg 98% on room air.  Patient was 68 kg.    Review of patient's laboratory studies patient has white blood cell count of 15.5, hemoglobin of 12.5, MCV of 85.3 with a platelet count of 412.  Neutrophil predominance of 69.4%.    ESR is 64, high-sensitivity CRP is 115.72.  Lactic acid 2.0.    Patient sodium is 139, potassium 3.3, chloride 101, carbon dioxide 25, BUN/creatinine 11.6/0.67 glucose of 104.  Calcium of 9.2.  Total protein/albumin 6.7/2.5.  LFTs unremarkable and noncontributory.    CT of the knee without contrast on the left shows no acute osseous findings but does show evidence of moderate knee effusion.    Patient admitted to the hospital for concerns of septic joint.  However, after initial review of patient's laboratory studies and clinical case orthopedics will like to hold off on IV antibiotics for now until arthrocentesis can be done in the morning.      ===========================================  Physical Exam:   (Clinical exam was done via telemedicine with nursing at bedside to assist with clinical exam, some portions of clinical exam from emergency room provider/nursing was used for reference)    /66   Pulse 96   Temp 97.9 °F (36.6 °C) (Oral)   Resp 16   Ht 5' 4" (1.626 m)   Wt 68 kg (150 lb)   LMP " 07/28/2024 (Exact Date)   SpO2 96%   Breastfeeding No   BMI 25.75 kg/m²     General: Mild Distress  HEENT: NCAT, EOMI, Moist Mucous Membranes  CV: S1S2 w/ RRR, (-) MRC, peripheral pulses intact and symmetrical  Resp: CTA w/o wrr symmetrical chest rise  GI: Snt, nd w/ bs  Musculoskeletal: Left knee effusion, with surrounding redness and increased heat.  Worsening pain with motion of knee.  No evidence of inguinal Lymphadenopathy  Skin: No obvious rash or lesion, normal skin color, appropriate skin turgor, dry  Neuro: No acute/new focal/gross neurological deficits appreciated, no facial asymmetry or weakness noted during interview/exam  Psych: Alert and oriented x3, appropriate mood and affect    Labs/imaging/medications/vitals/relevant electronic medical records have personally been reviewed by me    Patient screened for food insecurity, housing instability, transportation needs, utility difficulties, and  interpersonal safety.    No current facility-administered medications on file prior to encounter.     Current Outpatient Medications on File Prior to Encounter   Medication Sig Dispense Refill    acetaminophen (TYLENOL) 325 MG tablet Take 2 tablets (650 mg total) by mouth every 8 (eight) hours as needed for Pain. 60 tablet 0    albuterol (PROVENTIL/VENTOLIN HFA) 90 mcg/actuation inhaler albuterol sulfate HFA 90 mcg/actuation aerosol inhaler      aspirin 81 MG Chew Take 81 mg by mouth once daily.      ciprofloxacin HCl (CIPRO) 500 MG tablet Take 1 tablet (500 mg total) by mouth every 12 (twelve) hours. (Patient not taking: Reported on 7/26/2023) 12 tablet 0    ferrous sulfate (FEOSOL) 325 mg (65 mg iron) Tab tablet ferrous sulfate 325 mg (65 mg iron) tablet      HYDROcodone-acetaminophen (NORCO) 7.5-325 mg per tablet Take 1 tablet by mouth every 6 (six) hours as needed for Pain. 18 tablet 0    meloxicam (MOBIC) 15 MG tablet Take 1 tablet (15 mg total) by mouth once daily. 30 tablet 1    ondansetron (ZOFRAN) 4 MG  tablet Take 4 mg by mouth every 6 (six) hours as needed.      valACYclovir (VALTREX) 1000 MG tablet TK 1 T PO Q 8 H  0     Past Medical History:   Diagnosis Date    Cancer 2003    cervix    Hepatitis C      Past Surgical History:   Procedure Laterality Date    colonoscopy      ESOPHAGOGASTRODUODENOSCOPY      hemorhoidectomy      TONSILLECTOMY      TOTAL KNEE ARTHROPLASTY Left     TUBAL LIGATION       Social History     Socioeconomic History    Marital status: Legally    Tobacco Use    Smoking status: Every Day     Current packs/day: 0.50     Average packs/day: 0.5 packs/day for 13.0 years (6.5 ttl pk-yrs)     Types: Cigarettes, Vaping with nicotine    Smokeless tobacco: Never   Substance and Sexual Activity    Alcohol use: Not Currently     Alcohol/week: 2.0 standard drinks of alcohol     Types: 1 Glasses of wine, 1 Cans of beer per week    Drug use: Not Currently     Comment: 1.5 yrs clean    Sexual activity: Yes     Partners: Male     Birth control/protection: None     Family History   Problem Relation Name Age of Onset    Cancer Mother      Cancer Maternal Aunt

## 2024-08-08 NOTE — NURSING
Nurses Note -- 4 Eyes      8/7/2024   11:52 PM      Skin assessed during: Admit      [] No Altered Skin Integrity Present    []Prevention Measures Documented      [x] Yes- Altered Skin Integrity Present or Discovered   [x] LDA Added if Not in Epic (Describe Wound)   [] New Altered Skin Integrity was Present on Admit and Documented in LDA   [x] Wound Image Taken    Wound Care Consulted? Yes    Attending Nurse:  Albina Garza RN/Staff Member:   Beatriz ROMERO

## 2024-08-09 LAB
ANION GAP SERPL CALC-SCNC: 9 MEQ/L
BASOPHILS # BLD AUTO: 0.07 X10(3)/MCL
BASOPHILS NFR BLD AUTO: 0.9 %
BUN SERPL-MCNC: 6.7 MG/DL (ref 7–18.7)
CALCIUM SERPL-MCNC: 9 MG/DL (ref 8.4–10.2)
CHLORIDE SERPL-SCNC: 109 MMOL/L (ref 98–107)
CO2 SERPL-SCNC: 23 MMOL/L (ref 22–29)
CREAT SERPL-MCNC: 0.63 MG/DL (ref 0.55–1.02)
CREAT/UREA NIT SERPL: 11
EOSINOPHIL # BLD AUTO: 0.5 X10(3)/MCL (ref 0–0.9)
EOSINOPHIL NFR BLD AUTO: 6.2 %
ERYTHROCYTE [DISTWIDTH] IN BLOOD BY AUTOMATED COUNT: 12.7 % (ref 11.5–17)
GFR SERPLBLD CREATININE-BSD FMLA CKD-EPI: >60 ML/MIN/1.73/M2
GLUCOSE SERPL-MCNC: 94 MG/DL (ref 74–100)
HCT VFR BLD AUTO: 35.9 % (ref 37–47)
HGB BLD-MCNC: 12.3 G/DL (ref 12–16)
IMM GRANULOCYTES # BLD AUTO: 0.34 X10(3)/MCL (ref 0–0.04)
IMM GRANULOCYTES NFR BLD AUTO: 4.2 %
LYMPHOCYTES # BLD AUTO: 2.1 X10(3)/MCL (ref 0.6–4.6)
LYMPHOCYTES NFR BLD AUTO: 26.2 %
M AVIUM PARATB TISS QL ZN STN: NORMAL
MAGNESIUM SERPL-MCNC: 1.9 MG/DL (ref 1.6–2.6)
MCH RBC QN AUTO: 28.9 PG (ref 27–31)
MCHC RBC AUTO-ENTMCNC: 34.3 G/DL (ref 33–36)
MCV RBC AUTO: 84.5 FL (ref 80–94)
MONOCYTES # BLD AUTO: 0.68 X10(3)/MCL (ref 0.1–1.3)
MONOCYTES NFR BLD AUTO: 8.5 %
NEUTROPHILS # BLD AUTO: 4.34 X10(3)/MCL (ref 2.1–9.2)
NEUTROPHILS NFR BLD AUTO: 54 %
NRBC BLD AUTO-RTO: 0 %
PLATELET # BLD AUTO: 454 X10(3)/MCL (ref 130–400)
PMV BLD AUTO: 8.9 FL (ref 7.4–10.4)
POTASSIUM SERPL-SCNC: 3.7 MMOL/L (ref 3.5–5.1)
RBC # BLD AUTO: 4.25 X10(6)/MCL (ref 4.2–5.4)
SODIUM SERPL-SCNC: 141 MMOL/L (ref 136–145)
VANCOMYCIN TROUGH SERPL-MCNC: 14.2 UG/ML (ref 15–20)
WBC # BLD AUTO: 8.03 X10(3)/MCL (ref 4.5–11.5)

## 2024-08-09 PROCEDURE — 36415 COLL VENOUS BLD VENIPUNCTURE: CPT | Performed by: INTERNAL MEDICINE

## 2024-08-09 PROCEDURE — 25000003 PHARM REV CODE 250: Performed by: NURSE PRACTITIONER

## 2024-08-09 PROCEDURE — 80202 ASSAY OF VANCOMYCIN: CPT | Performed by: INTERNAL MEDICINE

## 2024-08-09 PROCEDURE — 80048 BASIC METABOLIC PNL TOTAL CA: CPT | Performed by: INTERNAL MEDICINE

## 2024-08-09 PROCEDURE — 11000001 HC ACUTE MED/SURG PRIVATE ROOM

## 2024-08-09 PROCEDURE — 63600175 PHARM REV CODE 636 W HCPCS: Performed by: INTERNAL MEDICINE

## 2024-08-09 PROCEDURE — 25000003 PHARM REV CODE 250: Performed by: INTERNAL MEDICINE

## 2024-08-09 PROCEDURE — 99900031 HC PATIENT EDUCATION (STAT)

## 2024-08-09 PROCEDURE — 85025 COMPLETE CBC W/AUTO DIFF WBC: CPT | Performed by: INTERNAL MEDICINE

## 2024-08-09 PROCEDURE — 94761 N-INVAS EAR/PLS OXIMETRY MLT: CPT

## 2024-08-09 PROCEDURE — 83735 ASSAY OF MAGNESIUM: CPT | Performed by: INTERNAL MEDICINE

## 2024-08-09 RX ORDER — TALC
6 POWDER (GRAM) TOPICAL NIGHTLY PRN
Status: DISCONTINUED | OUTPATIENT
Start: 2024-08-09 | End: 2024-08-12 | Stop reason: HOSPADM

## 2024-08-09 RX ORDER — ENOXAPARIN SODIUM 100 MG/ML
40 INJECTION SUBCUTANEOUS EVERY 24 HOURS
Status: DISCONTINUED | OUTPATIENT
Start: 2024-08-09 | End: 2024-08-12 | Stop reason: HOSPADM

## 2024-08-09 RX ADMIN — HYDROCODONE BITARTRATE AND ACETAMINOPHEN 1 TABLET: 7.5; 325 TABLET ORAL at 02:08

## 2024-08-09 RX ADMIN — HYDROCODONE BITARTRATE AND ACETAMINOPHEN 1 TABLET: 5; 325 TABLET ORAL at 05:08

## 2024-08-09 RX ADMIN — METHOCARBAMOL 750 MG: 750 TABLET ORAL at 05:08

## 2024-08-09 RX ADMIN — HYDROCODONE BITARTRATE AND ACETAMINOPHEN 1 TABLET: 5; 325 TABLET ORAL at 10:08

## 2024-08-09 RX ADMIN — HYDROCODONE BITARTRATE AND ACETAMINOPHEN 1 TABLET: 7.5; 325 TABLET ORAL at 06:08

## 2024-08-09 RX ADMIN — CEFEPIME 2 G: 2 INJECTION, POWDER, FOR SOLUTION INTRAVENOUS at 12:08

## 2024-08-09 RX ADMIN — VANCOMYCIN HYDROCHLORIDE 1000 MG: 1 INJECTION, POWDER, LYOPHILIZED, FOR SOLUTION INTRAVENOUS at 10:08

## 2024-08-09 RX ADMIN — CEFEPIME 2 G: 2 INJECTION, POWDER, FOR SOLUTION INTRAVENOUS at 08:08

## 2024-08-09 RX ADMIN — ENOXAPARIN SODIUM 40 MG: 40 INJECTION SUBCUTANEOUS at 05:08

## 2024-08-09 NOTE — NURSING
Nurses Note -- 4 Eyes      8/8/2024   7:36 PM      Skin assessed during: Q Shift Change      [x] No Altered Skin Integrity Present    [x]Prevention Measures Documented      [] Yes- Altered Skin Integrity Present or Discovered   [] LDA Added if Not in Epic (Describe Wound)   [] New Altered Skin Integrity was Present on Admit and Documented in LDA   [] Wound Image Taken    Wound Care Consulted? No    Attending Nurse:  Mackenzie Garza RN/Staff Member:   ARNIE Dick

## 2024-08-09 NOTE — PROGRESS NOTES
"No acute events overnight.  Pain controlled.  Resting in bed.     Vital Signs  Temp: 97.6 °F (36.4 °C)  Temp Source: Oral  Pulse: 75  Resp: 18  SpO2: 98 %  Device (Oxygen Therapy): room air  BP: 117/74  BP Location: Left arm  BP Method: Automatic  Height and Weight  Height: 5' 4" (162.6 cm)  Height Method: Stated  Weight: 68 kg (150 lb)  Weight Method: Stated  BSA (Calculated - sq m): 1.75 sq meters  BMI (Calculated): 25.7  Weight in (lb) to have BMI = 25: 145.3]    LLE:  +FHL/EHL  BCR distally  Dressing c/d/i  SILT distally    Recent Lab Results         08/09/24  0456   08/08/24  0934        WBC, Body Fluid   112,700  Comment: The reference interval(s) and other method performance specifications are unavailable for this body fluid. Comparison of the result with concentration in the blood, serum, or plasma is recommended.    Results obtained by dilution       Anion Gap 9.0         Baso # 0.07         Basophil % 0.9         Body Fluid Culture, Sterile   No Growth At 24 Hours  [P]       BUN 6.7         BUN/CREAT RATIO 11         Calcium 9.0         Chloride 109         CO2 23         Creatinine 0.63         Direct Acid Fast   No AFB seen (Direct smear only)       eGFR >60         Eos # 0.50         Eos % 6.2         Fluid Appearance   Turbid       Fluid Color   Yellow       Glucose 94         GRAM STAIN   Many WBC observed          No bacteria seen       Hematocrit 35.9         Hemoglobin 12.3         Immature Grans (Abs) 0.34         Immature Granulocytes 4.2         Lymph # 2.10         LYMPH % 26.2         Magnesium  1.90         MCH 28.9         MCHC 34.3         MCV 84.5         Mono # 0.68         Mono % 8.5         Mononuclear % BF   27.0       MPV 8.9         Neut # 4.34         Neut % 54.0         nRBC 0.0         Platelet Count 454         Polymorph % BF   73.0       Potassium 3.7         RBC 4.25         RBC BF   80,000  Comment: Results obtained by dilution       RDW 12.7         Sodium 141         WBC " 8.03                  [P] - Preliminary Result               A/P:  s/p L knee aspiration  Pain controlled  Overall patient doing well.  Therapy for mobility and ambulation.  Pending cx  Continue broad spectrum IV abx  Will need an washout of L knee with revision with abx spacer; will be evaluated by either Dr Lundberg or Dr Sheridan next week. She was made aware of this. She is to stay over the weekend. Will proceed with surgery either Monday or Tuesday.

## 2024-08-09 NOTE — PROGRESS NOTES
[facility]  [facility] Medicine Progress Note      Patient Name: Jeanie Wong  MRN: [id number]  Admission Date: 8/7/2024   Length of Stay: 2  Attending Physician: Kuldeep Reyes MD  Primary Care Provider: Suzy Primary Doctor  Face-to-Face encounter date: 08/09/2024    Code Status: Full Code        Chief Complaint:   Leg Pain (Pt c/o continued pain to left leg s/p fall 2 weeks ago. Hx total knee over one year ago.)        HPI:   Patient is a 39-year-old woman with history of hepatitis C, asthma, nicotine dependence, daily vaping, left knee replacement who had arrived at the emergency room with complaints of worsening left knee pain.     One and half weeks ago patient had fallen while walking down the stairs landed on her left knee where she had previously had a knee replacement.  Initially it bothered her for about 6 hours and was unable to walk.  She had gone to [facility] Emergency room at which time she had been sent home with a brace.  She had been using the narcotic pain medications that she has been given but had been having difficulty with worsening pain, swelling and redness of that specific knee.  She had recently run out of the pain medications and was no longer able to control her pain follow-up and walk on her leg without the crutches.     Patient denies any fevers or chills.  Patient denies any nausea or vomiting.  Patient does admit to some issues with mild constipation but denies any issues of diarrhea.     Initial vital signs in the emergency room temperature of 97.7 °F, 111 bpm, 20 respirations a minute, 111/68 mmHg 98% on room air.  Patient was 68 kg.     Review of patient's laboratory studies patient has white blood cell count of 15.5, hemoglobin of 12.5, MCV of 85.3 with a platelet count of 412.  Neutrophil predominance of 69.4%.     ESR is 64, high-sensitivity CRP is 115.72.  Lactic acid 2.0.     Patient sodium is 139, potassium 3.3,  chloride 101, carbon dioxide 25, BUN/creatinine 11.6/0.67 glucose of 104.  Calcium of 9.2.  Total protein/albumin 6.7/2.5.  LFTs unremarkable and noncontributory.     CT of the knee without contrast on the left shows no acute osseous findings but does show evidence of moderate knee effusion.     Patient admitted to the hospital for concerns of septic joint.  However, after initial review of patient's laboratory studies and clinical case orthopedics will like to hold off on IV antibiotics for now until arthrocentesis can be done in the morning.    Overview/Hospital Course:  No notes on file       Interval Hx:   Pt complaint +left knee pain, poor sleep, no f/c    Review of Systems   All other systems reviewed and are negative.      Objective/physical exam:  General: In no acute distress, afebrile  Chest: Clear to auscultation bilaterally  Heart: RRR, +S1, S2, no appreciable murmur  Abdomen: Soft, nontender, BS +  MSK: left knee swelling/effusion, tender, warm, erythema  Neurologic: Alert and oriented x4, Cranial nerve II-XII intact     VITAL SIGNS: 24 HRS MIN & MAX LAST   Temp  Min: 97.6 °F (36.4 °C)  Max: 98.4 °F (36.9 °C) 97.6 °F (36.4 °C)   BP  Min: 111/79  Max: 120/80 117/74   Pulse  Min: 74  Max: 82  75   Resp  Min: 17  Max: 19 19   SpO2  Min: 96 %  Max: 100 % 98 %       Recent Labs   Lab 08/07/24 1930 08/08/24 0453 08/09/24 0456   WBC 15.56* 11.93* 8.03   RBC 4.29 4.27 4.25   HGB 12.5 12.6 12.3   HCT 36.6* 36.3* 35.9*   MCV 85.3 85.0 84.5   MCH 29.1 29.5 28.9   MCHC 34.2 34.7 34.3   RDW 13.2 13.2 12.7   * 415* 454*   MPV 9.2 9.1 8.9       Recent Labs   Lab 08/07/24 1930 08/08/24 0453 08/09/24 0456    141 141   K 3.3* 3.3* 3.7    105 109*   CO2 25 27 23   BUN 11.6 8.6 6.7*   CREATININE 0.67 0.63 0.63   CALCIUM 9.2 8.8 9.0   MG  --  2.00 1.90   ALBUMIN 2.5*  --   --    ALKPHOS 127  --   --    ALT 17  --   --    AST 38*  --   --    BILITOT 0.4  --   --         Microbiology Results (last 7  days)       Procedure Component Value Units Date/Time    AFB Smear [[id number]] Collected: 08/08/24 0934    Order Status: Completed Specimen: Synovial Fluid from Knee, Left Updated: 08/09/24 0702     AFB Smear No AFB seen (Direct smear only)    Body Fluid Culture [[id number]] Collected: 08/08/24 0934    Order Status: Completed Specimen: Body Fluid from Knee, Left Updated: 08/09/24 0644     Body Fluid Culture No Growth At 24 Hours    Blood Culture #1 **CANNOT BE ORDERED STAT** [[id number]]  (Normal) Collected: 08/07/24 1938    Order Status: Completed Specimen: Blood from Forearm, Left Updated: 08/08/24 2201     Blood Culture No Growth At 24 Hours    Blood Culture #2 **CANNOT BE ORDERED STAT** [[id number]]  (Normal) Collected: 08/07/24 1930    Order Status: Completed Specimen: Blood from Antecubital, Left Updated: 08/08/24 2201     Blood Culture No Growth At 24 Hours    Gram Stain [[id number]] Collected: 08/08/24 0934    Order Status: Completed Specimen: Body Fluid from Knee, Left Updated: 08/08/24 1434     GRAM STAIN Many WBC observed      No bacteria seen    Anaerobic Culture [[id number]] Collected: 08/08/24 0934    Order Status: Sent Specimen: Body Fluid from Knee, Left Updated: 08/08/24 0948             Radiology:  US Lower Extremity Veins Bilateral  EXAMINATION  US LOWER EXTREMITY VEINS BILATERAL    CLINICAL HISTORY  Rule out DVT, bilateral;    TECHNIQUE  Multiplanar grayscale sonographic evaluation of the bilateral lower extremity deep venous structures, with color/spectral Doppler interrogation compressive techniques to evaluate vessel patency.    COMPARISON  None available at the time of initial interpretation.    FINDINGS  Exam quality: adequate for evaluation    The interrogated bilateral lower extremity deep venous structures demonstrate unremarkable gray scale morphology, compressibility, and spontaneous color flow.  Additionally, normal spectral waveforms are documented, with unremarkable augmentation and  respiratory variation.    No focal abnormalities of the visualized nonvascular tissues are identified.    IMPRESSION  No sonographic evidence of deep vein thrombosis.    Electronically signed by: Eugene Koo  Date:    08/08/2024  Time:    11:24      Scheduled Med:   ceFEPime IV (PEDS and ADULTS)  2 g Intravenous Q8H    vancomycin (VANCOCIN) IV (PEDS and ADULTS)  1,000 mg Intravenous Q12H        Continuous Infusions:   0.9% NaCl   Intravenous Continuous 125 mL/hr at 08/08/24 0028 New Bag at 08/08/24 0028        PRN Meds:    Current Facility-Administered Medications:     acetaminophen, 650 mg, Oral, Q4H PRN    dextrose 10%, 12.5 g, Intravenous, PRN    dextrose 10%, 25 g, Intravenous, PRN    glucagon (human recombinant), 1 mg, Intramuscular, PRN    glucose, 16 g, Oral, PRN    glucose, 24 g, Oral, PRN    HYDROcodone-acetaminophen, 1 tablet, Oral, Q4H PRN    HYDROcodone-acetaminophen, 1 tablet, Oral, Q4H PRN    methocarbamoL, 750 mg, Oral, Q6H PRN    morphine, 4 mg, Intravenous, Q4H PRN    naloxone, 0.02 mg, Intravenous, PRN    ondansetron, 4 mg, Intravenous, Q8H PRN    senna-docusate 8.6-50 mg, 1 tablet, Oral, BID PRN    sodium chloride 0.9%, 10 mL, Intravenous, Q12H PRN     Nutrition Status:      Assessment/Plan:  Left knee suspected prosthetic joint infection  Left knee pain  Hx left knee replacement  Hypokalemia    Plan    Refer to ortho rec-washout next week  Cxs neg  Cont Vancomycin/cefepime  Discuss pain meds     Dvt proph: lovenox      All diagnosis and differential diagnosis have been reviewed; assessment and plan has been documented; I have personally reviewed the labs and test results that are presently available; I have reviewed the patients medication list; I have reviewed the consulting providers response and recommendations. I have reviewed or attempted to review medical records based upon their availability      _____________________________________________________________________            Kuldeep HORVATH  MD Eric   08/09/2024

## 2024-08-09 NOTE — PT/OT/SLP PROGRESS
Physical Therapy      Patient Name:  Jeanie Wong   MRN:  [id number]    Patient not seen today secondary to Patient fatigue. PT attempted initial evaluation. Upon arrival, supine side lying in bed with all needs in reach sleeping. Pt grimacing as PT enters room. Pt states she does not want to participate today and would like to sleep. PT educated pt on importance ,however, pt continued to refuse. Pt states she will agree to participate tomorrow. Rn notified of PTs findings.

## 2024-08-10 LAB
ANION GAP SERPL CALC-SCNC: 9 MEQ/L
BASOPHILS # BLD AUTO: 0.07 X10(3)/MCL
BASOPHILS NFR BLD AUTO: 0.8 %
BUN SERPL-MCNC: 7.7 MG/DL (ref 7–18.7)
CALCIUM SERPL-MCNC: 9.2 MG/DL (ref 8.4–10.2)
CHLORIDE SERPL-SCNC: 109 MMOL/L (ref 98–107)
CO2 SERPL-SCNC: 24 MMOL/L (ref 22–29)
CREAT SERPL-MCNC: 0.65 MG/DL (ref 0.55–1.02)
CREAT/UREA NIT SERPL: 12
EOSINOPHIL # BLD AUTO: 0.34 X10(3)/MCL (ref 0–0.9)
EOSINOPHIL NFR BLD AUTO: 3.9 %
ERYTHROCYTE [DISTWIDTH] IN BLOOD BY AUTOMATED COUNT: 12.6 % (ref 11.5–17)
GFR SERPLBLD CREATININE-BSD FMLA CKD-EPI: >60 ML/MIN/1.73/M2
GLUCOSE SERPL-MCNC: 84 MG/DL (ref 74–100)
HCT VFR BLD AUTO: 35.9 % (ref 37–47)
HGB BLD-MCNC: 12.2 G/DL (ref 12–16)
IMM GRANULOCYTES # BLD AUTO: 0.16 X10(3)/MCL (ref 0–0.04)
IMM GRANULOCYTES NFR BLD AUTO: 1.9 %
LYMPHOCYTES # BLD AUTO: 2.42 X10(3)/MCL (ref 0.6–4.6)
LYMPHOCYTES NFR BLD AUTO: 28 %
MAGNESIUM SERPL-MCNC: 2 MG/DL (ref 1.6–2.6)
MCH RBC QN AUTO: 29.3 PG (ref 27–31)
MCHC RBC AUTO-ENTMCNC: 34 G/DL (ref 33–36)
MCV RBC AUTO: 86.1 FL (ref 80–94)
MONOCYTES # BLD AUTO: 0.67 X10(3)/MCL (ref 0.1–1.3)
MONOCYTES NFR BLD AUTO: 7.8 %
NEUTROPHILS # BLD AUTO: 4.97 X10(3)/MCL (ref 2.1–9.2)
NEUTROPHILS NFR BLD AUTO: 57.6 %
NRBC BLD AUTO-RTO: 0 %
PLATELET # BLD AUTO: 464 X10(3)/MCL (ref 130–400)
PMV BLD AUTO: 9.1 FL (ref 7.4–10.4)
POTASSIUM SERPL-SCNC: 3.7 MMOL/L (ref 3.5–5.1)
RBC # BLD AUTO: 4.17 X10(6)/MCL (ref 4.2–5.4)
SODIUM SERPL-SCNC: 142 MMOL/L (ref 136–145)
WBC # BLD AUTO: 8.63 X10(3)/MCL (ref 4.5–11.5)

## 2024-08-10 PROCEDURE — 25000003 PHARM REV CODE 250: Performed by: INTERNAL MEDICINE

## 2024-08-10 PROCEDURE — 85025 COMPLETE CBC W/AUTO DIFF WBC: CPT | Performed by: INTERNAL MEDICINE

## 2024-08-10 PROCEDURE — 83735 ASSAY OF MAGNESIUM: CPT | Performed by: INTERNAL MEDICINE

## 2024-08-10 PROCEDURE — 99900031 HC PATIENT EDUCATION (STAT)

## 2024-08-10 PROCEDURE — 36415 COLL VENOUS BLD VENIPUNCTURE: CPT | Performed by: INTERNAL MEDICINE

## 2024-08-10 PROCEDURE — 94761 N-INVAS EAR/PLS OXIMETRY MLT: CPT

## 2024-08-10 PROCEDURE — 25000003 PHARM REV CODE 250: Performed by: NURSE PRACTITIONER

## 2024-08-10 PROCEDURE — 80048 BASIC METABOLIC PNL TOTAL CA: CPT | Performed by: INTERNAL MEDICINE

## 2024-08-10 PROCEDURE — 63600175 PHARM REV CODE 636 W HCPCS: Performed by: NURSE PRACTITIONER

## 2024-08-10 PROCEDURE — 11000001 HC ACUTE MED/SURG PRIVATE ROOM

## 2024-08-10 PROCEDURE — 63600175 PHARM REV CODE 636 W HCPCS: Performed by: INTERNAL MEDICINE

## 2024-08-10 RX ORDER — ALPRAZOLAM 0.25 MG/1
0.25 TABLET ORAL 3 TIMES DAILY PRN
Status: DISCONTINUED | OUTPATIENT
Start: 2024-08-10 | End: 2024-08-11

## 2024-08-10 RX ADMIN — VANCOMYCIN HYDROCHLORIDE 1250 MG: 1.25 INJECTION, POWDER, LYOPHILIZED, FOR SOLUTION INTRAVENOUS at 11:08

## 2024-08-10 RX ADMIN — CEFEPIME 2 G: 2 INJECTION, POWDER, FOR SOLUTION INTRAVENOUS at 11:08

## 2024-08-10 RX ADMIN — CEFEPIME 2 G: 2 INJECTION, POWDER, FOR SOLUTION INTRAVENOUS at 03:08

## 2024-08-10 RX ADMIN — CEFEPIME 2 G: 2 INJECTION, POWDER, FOR SOLUTION INTRAVENOUS at 08:08

## 2024-08-10 RX ADMIN — HYDROCODONE BITARTRATE AND ACETAMINOPHEN 1 TABLET: 5; 325 TABLET ORAL at 03:08

## 2024-08-10 RX ADMIN — HYDROCODONE BITARTRATE AND ACETAMINOPHEN 1 TABLET: 5; 325 TABLET ORAL at 12:08

## 2024-08-10 RX ADMIN — HYDROCODONE BITARTRATE AND ACETAMINOPHEN 1 TABLET: 5; 325 TABLET ORAL at 08:08

## 2024-08-10 RX ADMIN — ALPRAZOLAM 0.25 MG: 0.25 TABLET ORAL at 01:08

## 2024-08-10 RX ADMIN — SODIUM CHLORIDE: 9 INJECTION, SOLUTION INTRAVENOUS at 06:08

## 2024-08-10 RX ADMIN — VANCOMYCIN HYDROCHLORIDE 1250 MG: 1.25 INJECTION, POWDER, LYOPHILIZED, FOR SOLUTION INTRAVENOUS at 12:08

## 2024-08-10 RX ADMIN — ALPRAZOLAM 0.25 MG: 0.25 TABLET ORAL at 08:08

## 2024-08-10 RX ADMIN — ENOXAPARIN SODIUM 40 MG: 40 INJECTION SUBCUTANEOUS at 04:08

## 2024-08-10 RX ADMIN — HYDROCODONE BITARTRATE AND ACETAMINOPHEN 1 TABLET: 5; 325 TABLET ORAL at 04:08

## 2024-08-10 NOTE — PLAN OF CARE
Problem: Adult Inpatient Plan of Care  Goal: Plan of Care Review  Outcome: Progressing  Goal: Patient-Specific Goal (Individualized)  Outcome: Progressing  Goal: Absence of Hospital-Acquired Illness or Injury  Outcome: Progressing  Goal: Optimal Comfort and Wellbeing  Outcome: Progressing  Goal: Readiness for Transition of Care  Outcome: Progressing     Problem: Knee Arthroplasty  Goal: Optimal Coping  Outcome: Progressing  Goal: Absence of Bleeding  Outcome: Progressing  Goal: Effective Bowel Elimination  Outcome: Progressing  Goal: Fluid and Electrolyte Balance  Outcome: Progressing  Goal: Optimal Functional Ability  Outcome: Progressing  Goal: Absence of Infection Signs and Symptoms  Outcome: Progressing  Goal: Intact Neurovascular Status  Outcome: Progressing  Goal: Anesthesia/Sedation Recovery  Outcome: Progressing  Goal: Optimal Pain Control and Function  Outcome: Progressing  Goal: Nausea and Vomiting Relief  Outcome: Progressing  Goal: Effective Urinary Elimination  Outcome: Progressing  Goal: Effective Oxygenation and Ventilation  Outcome: Progressing     Problem: Infection  Goal: Absence of Infection Signs and Symptoms  Outcome: Progressing     Problem: Pain Acute  Goal: Optimal Pain Control and Function  Outcome: Progressing

## 2024-08-10 NOTE — NURSING
Nurses Note -- 4 Eyes      8/9/2024   7:27 PM      Skin assessed during: Q Shift Change      [x] No Altered Skin Integrity Present    []Prevention Measures Documented      [] Yes- Altered Skin Integrity Present or Discovered   [] LDA Added if Not in Epic (Describe Wound)   [] New Altered Skin Integrity was Present on Admit and Documented in LDA   [] Wound Image Taken    Wound Care Consulted? No    Attending Nurse:  Mackenzie Garza RN/Staff Member:   Sunitha

## 2024-08-10 NOTE — PROGRESS NOTES
Pharmacokinetic Assessment Follow Up: IV Vancomycin    Vancomycin serum concentration assessment(s):    The trough level was drawn correctly and can be used to guide therapy at this time. The measurement is below the desired definitive target range of 15 to 20 mcg/mL.    Vancomycin Regimen Plan:    Change regimen to Vancomycin 1250 mg IV every 12 hours with next serum trough concentration measured at 60 min prior to 1230 dose on 08/11    Drug levels (last 3 results):  Recent Labs   Lab Result Units 08/09/24  2331   Vancomycin Trough ug/ml 14.2*       Pharmacy will continue to follow and monitor vancomycin.    Please contact pharmacy at extension [phone] for questions regarding this assessment.    Thank you for the consult,   Garret Connors       Patient brief summary:  Jeanie Wong is a 39 y.o. female initiated on antimicrobial therapy with IV Vancomycin for treatment of bone/joint infection    The patient's current regimen is 1250mg iv q 12hrs    Drug Allergies:   Review of patient's allergies indicates:   Allergen Reactions    Benadryl allergy decongestant Shortness Of Breath    Diphenhydramine hcl     Sulfa (sulfonamide antibiotics) Swelling       Actual Body Weight:   68kg    Renal Function:   Estimated Creatinine Clearance: 112.4 mL/min (based on SCr of 0.63 mg/dL).,     Dialysis Method (if applicable):  N/A    CBC (last 72 hours):  Recent Labs   Lab Result Units 08/07/24 1930 08/08/24 0453 08/09/24  0456   WBC x10(3)/mcL 15.56* 11.93* 8.03   Hgb g/dL 12.5 12.6 12.3   Hct % 36.6* 36.3* 35.9*   Platelet x10(3)/mcL 412* 415* 454*   Mono % % 6.7  --  8.5   Monocytes % %  --  4  --    Eos % % 3.5  --  6.2   Basophil % % 0.6  --  0.9       Metabolic Panel (last 72 hours):  Recent Labs   Lab Result Units 08/07/24 1930 08/08/24 0453 08/09/24  0456   Sodium mmol/L 139 141 141   Potassium mmol/L 3.3* 3.3* 3.7   Chloride mmol/L 101 105 109*   CO2 mmol/L 25 27 23   Glucose mg/dL 104* 86 94   Blood Urea Nitrogen mg/dL  11.6 8.6 6.7*   Creatinine mg/dL 0.67 0.63 0.63   Albumin g/dL 2.5*  --   --    Bilirubin Total mg/dL 0.4  --   --    ALP unit/L 127  --   --    AST unit/L 38*  --   --    ALT unit/L 17  --   --    Magnesium Level mg/dL  --  2.00 1.90       Vancomycin Administrations:  vancomycin given in the last 96 hours                     vancomycin (VANCOCIN) 1,000 mg in D5W 250 mL IVPB (Vial-Mate) (mg) 1,000 mg New Bag 08/09/24 1020     1,000 mg New Bag 08/08/24 2032    vancomycin 1,500 mg in D5W 250 mL IVPB (Vial-Mate) (mg) 1,500 mg New Bag 08/08/24 1349                    Microbiologic Results:  Microbiology Results (last 7 days)       Procedure Component Value Units Date/Time    Blood Culture #1 **CANNOT BE ORDERED STAT** [[id number]]  (Normal) Collected: 08/07/24 1938    Order Status: Completed Specimen: Blood from Forearm, Left Updated: 08/09/24 2201     Blood Culture No Growth At 48 Hours    Blood Culture #2 **CANNOT BE ORDERED STAT** [[id number]]  (Normal) Collected: 08/07/24 1930    Order Status: Completed Specimen: Blood from Antecubital, Left Updated: 08/09/24 2201     Blood Culture No Growth At 48 Hours    AFB Smear [[id number]] Collected: 08/08/24 0934    Order Status: Completed Specimen: Synovial Fluid from Knee, Left Updated: 08/09/24 0702     AFB Smear No AFB seen (Direct smear only)    Body Fluid Culture [[id number]] Collected: 08/08/24 0934    Order Status: Completed Specimen: Body Fluid from Knee, Left Updated: 08/09/24 0644     Body Fluid Culture No Growth At 24 Hours    Gram Stain [[id number]] Collected: 08/08/24 0934    Order Status: Completed Specimen: Body Fluid from Knee, Left Updated: 08/08/24 1434     GRAM STAIN Many WBC observed      No bacteria seen    Anaerobic Culture [[id number]] Collected: 08/08/24 0934    Order Status: Sent Specimen: Body Fluid from Knee, Left Updated: 08/08/24 0948

## 2024-08-10 NOTE — PROGRESS NOTES
"No acute events overnight.  Pain controlled.  Resting in bed. C/o knee pain    Vital Signs  Temp: 97.8 °F (36.6 °C)  Temp Source: Oral  Pulse: 75  Resp: 18  SpO2: 99 %  Device (Oxygen Therapy): room air  BP: 117/79  BP Location: Left arm  BP Method: Automatic  Height and Weight  Height: 5' 4" (162.6 cm)  Height Method: Stated  Weight: 68 kg (150 lb)  Weight Method: Stated  BSA (Calculated - sq m): 1.75 sq meters  BMI (Calculated): 25.7  Weight in (lb) to have BMI = 25: 145.3]    +FHL/EHL  BCR distally  Effusion to L knee    Recent Lab Results         08/10/24  0650   08/10/24  0639   08/09/24  2331        Anion Gap 9.0           Baso #   0.07         Basophil %   0.8         BUN 7.7           BUN/CREAT RATIO 12           Calcium 9.2           Chloride 109           CO2 24           Creatinine 0.65           eGFR >60           Eos #   0.34         Eos %   3.9         Glucose 84           Hematocrit   35.9         Hemoglobin   12.2         Immature Grans (Abs)   0.16         Immature Granulocytes   1.9         Lymph #   2.42         LYMPH %   28.0         Magnesium  2.00           MCH   29.3         MCHC   34.0         MCV   86.1         Mono #   0.67         Mono %   7.8         MPV   9.1         Neut #   4.97         Neut %   57.6         nRBC   0.0         Platelet Count   464         Potassium 3.7           RBC   4.17         RDW   12.6         Sodium 142           Vancomycin-Trough     14.2       WBC   8.63                 A/P:  Status post TKA with acute post op infection  R/B/A discussed in detail.  All question answered  To OR Monday for I&D, revision L TKA      "

## 2024-08-10 NOTE — PROGRESS NOTES
[facility]  [facility] Medicine Progress Note      Patient Name: Jeanie Wong  MRN: [id number]  Admission Date: 8/7/2024   Length of Stay: 3  Attending Physician: Kuldeep Reyes MD  Primary Care Provider: Suzy Primary Doctor  Face-to-Face encounter date: 08/10/2024    Code Status: Full Code        Chief Complaint:   Leg Pain (Pt c/o continued pain to left leg s/p fall 2 weeks ago. Hx total knee over one year ago.)        HPI:   Patient is a 39-year-old woman with history of hepatitis C, asthma, nicotine dependence, daily vaping, left knee replacement who had arrived at the emergency room with complaints of worsening left knee pain.     One and half weeks ago patient had fallen while walking down the stairs landed on her left knee where she had previously had a knee replacement.  Initially it bothered her for about 6 hours and was unable to walk.  She had gone to [facility] Emergency room at which time she had been sent home with a brace.  She had been using the narcotic pain medications that she has been given but had been having difficulty with worsening pain, swelling and redness of that specific knee.  She had recently run out of the pain medications and was no longer able to control her pain follow-up and walk on her leg without the crutches.     Patient denies any fevers or chills.  Patient denies any nausea or vomiting.  Patient does admit to some issues with mild constipation but denies any issues of diarrhea.     Initial vital signs in the emergency room temperature of 97.7 °F, 111 bpm, 20 respirations a minute, 111/68 mmHg 98% on room air.  Patient was 68 kg.     Review of patient's laboratory studies patient has white blood cell count of 15.5, hemoglobin of 12.5, MCV of 85.3 with a platelet count of 412.  Neutrophil predominance of 69.4%.     ESR is 64, high-sensitivity CRP is 115.72.  Lactic acid 2.0.     Patient sodium is 139, potassium 3.3,  chloride 101, carbon dioxide 25, BUN/creatinine 11.6/0.67 glucose of 104.  Calcium of 9.2.  Total protein/albumin 6.7/2.5.  LFTs unremarkable and noncontributory.     CT of the knee without contrast on the left shows no acute osseous findings but does show evidence of moderate knee effusion.     Patient admitted to the hospital for concerns of septic joint.  However, after initial review of patient's laboratory studies and clinical case orthopedics will like to hold off on IV antibiotics for now until arthrocentesis can be done in the morning.    Overview/Hospital Course:  No notes on file       Interval Hx:   No new complaints, +anxious    Review of Systems   All other systems reviewed and are negative.      Objective/physical exam:  General: In no acute distress, afebrile  Chest: Clear to auscultation bilaterally  Heart: RRR, +S1, S2, no appreciable murmur  Abdomen: Soft, nontender, BS +  MSK: left knee swelling/effusion, tender, warm, erythema  Neurologic: Alert and oriented x4, Cranial nerve II-XII intact     VITAL SIGNS: 24 HRS MIN & MAX LAST   Temp  Min: 97.5 °F (36.4 °C)  Max: 97.9 °F (36.6 °C) 97.7 °F (36.5 °C)   BP  Min: 101/53  Max: 123/81 118/81   Pulse  Min: 68  Max: 76  76   Resp  Min: 16  Max: 18 18   SpO2  Min: 97 %  Max: 99 % 99 %       Recent Labs   Lab 08/08/24  0453 08/09/24  0456 08/10/24  0639   WBC 11.93* 8.03 8.63   RBC 4.27 4.25 4.17*   HGB 12.6 12.3 12.2   HCT 36.3* 35.9* 35.9*   MCV 85.0 84.5 86.1   MCH 29.5 28.9 29.3   MCHC 34.7 34.3 34.0   RDW 13.2 12.7 12.6   * 454* 464*   MPV 9.1 8.9 9.1       Recent Labs   Lab 08/07/24  1930 08/08/24  0453 08/09/24  0456 08/10/24  0650    141 141 142   K 3.3* 3.3* 3.7 3.7    105 109* 109*   CO2 25 27 23 24   BUN 11.6 8.6 6.7* 7.7   CREATININE 0.67 0.63 0.63 0.65   CALCIUM 9.2 8.8 9.0 9.2   MG  --  2.00 1.90 2.00   ALBUMIN 2.5*  --   --   --    ALKPHOS 127  --   --   --    ALT 17  --   --   --    AST 38*  --   --   --    BILITOT 0.4   --   --   --         Microbiology Results (last 7 days)       Procedure Component Value Units Date/Time    Body Fluid Culture [[id number]]  (Abnormal) Collected: 08/08/24 0934    Order Status: Completed Specimen: Body Fluid from Knee, Left Updated: 08/10/24 1215     Body Fluid Culture Few Streptococcus agalactiae (Group B)    Anaerobic Culture [[id number]] Collected: 08/08/24 0934    Order Status: Completed Specimen: Body Fluid from Knee, Left Updated: 08/10/24 0710     Anaerobe Culture No Anaerobes Isolated    Blood Culture #1 **CANNOT BE ORDERED STAT** [[id number]]  (Normal) Collected: 08/07/24 1938    Order Status: Completed Specimen: Blood from Forearm, Left Updated: 08/09/24 2201     Blood Culture No Growth At 48 Hours    Blood Culture #2 **CANNOT BE ORDERED STAT** [[id number]]  (Normal) Collected: 08/07/24 1930    Order Status: Completed Specimen: Blood from Antecubital, Left Updated: 08/09/24 2201     Blood Culture No Growth At 48 Hours    AFB Smear [[id number]] Collected: 08/08/24 0934    Order Status: Completed Specimen: Synovial Fluid from Knee, Left Updated: 08/09/24 0702     AFB Smear No AFB seen (Direct smear only)    Gram Stain [[id number]] Collected: 08/08/24 0934    Order Status: Completed Specimen: Body Fluid from Knee, Left Updated: 08/08/24 1434     GRAM STAIN Many WBC observed      No bacteria seen             Radiology:  US Lower Extremity Veins Bilateral  EXAMINATION  US LOWER EXTREMITY VEINS BILATERAL    CLINICAL HISTORY  Rule out DVT, bilateral;    TECHNIQUE  Multiplanar grayscale sonographic evaluation of the bilateral lower extremity deep venous structures, with color/spectral Doppler interrogation compressive techniques to evaluate vessel patency.    COMPARISON  None available at the time of initial interpretation.    FINDINGS  Exam quality: adequate for evaluation    The interrogated bilateral lower extremity deep venous structures demonstrate unremarkable gray scale morphology,  compressibility, and spontaneous color flow.  Additionally, normal spectral waveforms are documented, with unremarkable augmentation and respiratory variation.    No focal abnormalities of the visualized nonvascular tissues are identified.    IMPRESSION  No sonographic evidence of deep vein thrombosis.    Electronically signed by: Eugene Koo  Date:    08/08/2024  Time:    11:24      Scheduled Med:   ceFEPime IV (PEDS and ADULTS)  2 g Intravenous Q8H    enoxparin  40 mg Subcutaneous Q24H (prophylaxis, 1700)    vancomycin (VANCOCIN) IV (PEDS and ADULTS)  1,250 mg Intravenous Q12H        Continuous Infusions:   0.9% NaCl   Intravenous Continuous 75 mL/hr at 08/10/24 1050 Rate Verify at 08/10/24 1050        PRN Meds:    Current Facility-Administered Medications:     acetaminophen, 650 mg, Oral, Q4H PRN    dextrose 10%, 12.5 g, Intravenous, PRN    dextrose 10%, 25 g, Intravenous, PRN    glucagon (human recombinant), 1 mg, Intramuscular, PRN    glucose, 16 g, Oral, PRN    glucose, 24 g, Oral, PRN    HYDROcodone-acetaminophen, 1 tablet, Oral, Q4H PRN    HYDROcodone-acetaminophen, 1 tablet, Oral, Q4H PRN    melatonin, 6 mg, Oral, Nightly PRN    methocarbamoL, 750 mg, Oral, Q6H PRN    morphine, 4 mg, Intravenous, Q4H PRN    naloxone, 0.02 mg, Intravenous, PRN    ondansetron, 4 mg, Intravenous, Q8H PRN    senna-docusate 8.6-50 mg, 1 tablet, Oral, BID PRN    sodium chloride 0.9%, 10 mL, Intravenous, Q12H PRN    vancomycin - pharmacy to dose, , Intravenous, pharmacy to manage frequency     Nutrition Status:      Assessment/Plan:  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 I have reviewed or attempted to review medical records based upon their availability      _____________________________________________________________________            Kuldeep Reyes MD   08/10/2024

## 2024-08-10 NOTE — NURSING
Nurses Note -- 4 Eyes      8/9/2024   7:13 PM      Skin assessed during: Q Shift Change      [x] No Altered Skin Integrity Present    [x]Prevention Measures Documented      [] Yes- Altered Skin Integrity Present or Discovered   [] LDA Added if Not in Epic (Describe Wound)   [] New Altered Skin Integrity was Present on Admit and Documented in LDA   [] Wound Image Taken    Wound Care Consulted? No    Attending Nurse:  Sunitha Garza RN/Staff Member:   Mayelin

## 2024-08-10 NOTE — PT/OT/SLP PROGRESS
Physical Therapy      Patient Name:  Jeanie Wong   MRN:  [id number]    Patient not seen today secondary to Patient unwilling to participate. Will follow-up as appropriate.

## 2024-08-10 NOTE — NURSING
Nurses Note -- 4 Eyes      8/10/2024   6:47 AM      Skin assessed during: Q Shift Change      [x] No Altered Skin Integrity Present    []Prevention Measures Documented      [] Yes- Altered Skin Integrity Present or Discovered   [] LDA Added if Not in Epic (Describe Wound)   [] New Altered Skin Integrity was Present on Admit and Documented in LDA   [] Wound Image Taken    Wound Care Consulted? No    Attending Nurse:  Kacey Garza RN/Staff Member:   Bren

## 2024-08-11 LAB
BACTERIA FLD CULT: ABNORMAL
BACTERIA SPEC ANAEROBE CULT: NORMAL
VANCOMYCIN TROUGH SERPL-MCNC: 20.5 UG/ML (ref 15–20)

## 2024-08-11 PROCEDURE — S4991 NICOTINE PATCH NONLEGEND: HCPCS | Performed by: INTERNAL MEDICINE

## 2024-08-11 PROCEDURE — 25000003 PHARM REV CODE 250: Performed by: INTERNAL MEDICINE

## 2024-08-11 PROCEDURE — 63600175 PHARM REV CODE 636 W HCPCS: Performed by: NURSE PRACTITIONER

## 2024-08-11 PROCEDURE — 97162 PT EVAL MOD COMPLEX 30 MIN: CPT

## 2024-08-11 PROCEDURE — 80202 ASSAY OF VANCOMYCIN: CPT | Performed by: NURSE PRACTITIONER

## 2024-08-11 PROCEDURE — 36415 COLL VENOUS BLD VENIPUNCTURE: CPT | Performed by: NURSE PRACTITIONER

## 2024-08-11 PROCEDURE — 63600175 PHARM REV CODE 636 W HCPCS: Performed by: INTERNAL MEDICINE

## 2024-08-11 PROCEDURE — 11000001 HC ACUTE MED/SURG PRIVATE ROOM

## 2024-08-11 PROCEDURE — 25000003 PHARM REV CODE 250: Performed by: NURSE PRACTITIONER

## 2024-08-11 PROCEDURE — 97116 GAIT TRAINING THERAPY: CPT

## 2024-08-11 RX ORDER — ALPRAZOLAM 0.25 MG/1
0.5 TABLET ORAL 3 TIMES DAILY PRN
Status: DISCONTINUED | OUTPATIENT
Start: 2024-08-11 | End: 2024-08-12 | Stop reason: HOSPADM

## 2024-08-11 RX ORDER — IBUPROFEN 200 MG
1 TABLET ORAL DAILY
Status: DISCONTINUED | OUTPATIENT
Start: 2024-08-11 | End: 2024-08-12 | Stop reason: HOSPADM

## 2024-08-11 RX ADMIN — HYDROCODONE BITARTRATE AND ACETAMINOPHEN 1 TABLET: 5; 325 TABLET ORAL at 11:08

## 2024-08-11 RX ADMIN — CEFEPIME 2 G: 2 INJECTION, POWDER, FOR SOLUTION INTRAVENOUS at 04:08

## 2024-08-11 RX ADMIN — ALPRAZOLAM 0.25 MG: 0.25 TABLET ORAL at 07:08

## 2024-08-11 RX ADMIN — ALPRAZOLAM 0.5 MG: 0.25 TABLET ORAL at 04:08

## 2024-08-11 RX ADMIN — CEFEPIME 2 G: 2 INJECTION, POWDER, FOR SOLUTION INTRAVENOUS at 11:08

## 2024-08-11 RX ADMIN — VANCOMYCIN HYDROCHLORIDE 1250 MG: 1.25 INJECTION, POWDER, LYOPHILIZED, FOR SOLUTION INTRAVENOUS at 01:08

## 2024-08-11 RX ADMIN — ENOXAPARIN SODIUM 40 MG: 40 INJECTION SUBCUTANEOUS at 04:08

## 2024-08-11 RX ADMIN — HYDROCODONE BITARTRATE AND ACETAMINOPHEN 1 TABLET: 7.5; 325 TABLET ORAL at 01:08

## 2024-08-11 RX ADMIN — NICOTINE 1 PATCH: 14 PATCH, EXTENDED RELEASE TRANSDERMAL at 06:08

## 2024-08-11 RX ADMIN — SODIUM CHLORIDE: 9 INJECTION, SOLUTION INTRAVENOUS at 08:08

## 2024-08-11 RX ADMIN — HYDROCODONE BITARTRATE AND ACETAMINOPHEN 1 TABLET: 5; 325 TABLET ORAL at 12:08

## 2024-08-11 RX ADMIN — HYDROCODONE BITARTRATE AND ACETAMINOPHEN 1 TABLET: 5; 325 TABLET ORAL at 09:08

## 2024-08-11 RX ADMIN — HYDROCODONE BITARTRATE AND ACETAMINOPHEN 1 TABLET: 5; 325 TABLET ORAL at 06:08

## 2024-08-11 RX ADMIN — CEFEPIME 2 G: 2 INJECTION, POWDER, FOR SOLUTION INTRAVENOUS at 08:08

## 2024-08-11 NOTE — PROGRESS NOTES
[facility]  [facility] Medicine Progress Note      Patient Name: Jeanie Wong  MRN: [id number]  Admission Date: 8/7/2024   Length of Stay: 4  Attending Physician: Kuldeep Reyes MD  Primary Care Provider: Suzy Primary Doctor  Face-to-Face encounter date: 08/11/2024    Code Status: Full Code        Chief Complaint:   Leg Pain (Pt c/o continued pain to left leg s/p fall 2 weeks ago. Hx total knee over one year ago.)        HPI:   Patient is a 39-year-old woman with history of hepatitis C, asthma, nicotine dependence, daily vaping, left knee replacement who had arrived at the emergency room with complaints of worsening left knee pain.     One and half weeks ago patient had fallen while walking down the stairs landed on her left knee where she had previously had a knee replacement.  Initially it bothered her for about 6 hours and was unable to walk.  She had gone to [facility] Emergency room at which time she had been sent home with a brace.  She had been using the narcotic pain medications that she has been given but had been having difficulty with worsening pain, swelling and redness of that specific knee.  She had recently run out of the pain medications and was no longer able to control her pain follow-up and walk on her leg without the crutches.     Patient denies any fevers or chills.  Patient denies any nausea or vomiting.  Patient does admit to some issues with mild constipation but denies any issues of diarrhea.     Initial vital signs in the emergency room temperature of 97.7 °F, 111 bpm, 20 respirations a minute, 111/68 mmHg 98% on room air.  Patient was 68 kg.     Review of patient's laboratory studies patient has white blood cell count of 15.5, hemoglobin of 12.5, MCV of 85.3 with a platelet count of 412.  Neutrophil predominance of 69.4%.     ESR is 64, high-sensitivity CRP is 115.72.  Lactic acid 2.0.     Patient sodium is 139, potassium 3.3,  chloride 101, carbon dioxide 25, BUN/creatinine 11.6/0.67 glucose of 104.  Calcium of 9.2.  Total protein/albumin 6.7/2.5.  LFTs unremarkable and noncontributory.     CT of the knee without contrast on the left shows no acute osseous findings but does show evidence of moderate knee effusion.     Patient admitted to the hospital for concerns of septic joint.  However, after initial review of patient's laboratory studies and clinical case orthopedics will like to hold off on IV antibiotics for now until arthrocentesis can be done in the morning.    Overview/Hospital Course:  No notes on file       Interval Hx:   No new complaints, +anxious, panic attack today wanting to leave    Review of Systems   All other systems reviewed and are negative.      Objective/physical exam:  General: In no acute distress, afebrile  Chest: Clear to auscultation bilaterally  Heart: RRR, +S1, S2, no appreciable murmur  Abdomen: Soft, nontender, BS +  MSK: left knee swelling/effusion, tender, warm, erythema  Neurologic: Alert and oriented x4, Cranial nerve II-XII intact     VITAL SIGNS: 24 HRS MIN & MAX LAST   Temp  Min: 97.8 °F (36.6 °C)  Max: 98.5 °F (36.9 °C) 98.5 °F (36.9 °C)   BP  Min: 94/63  Max: 132/88 132/88   Pulse  Min: 68  Max: 87  76   Resp  Min: 18  Max: 20 18   SpO2  Min: 96 %  Max: 99 % 99 %       Recent Labs   Lab 08/08/24  0453 08/09/24  0456 08/10/24  0639   WBC 11.93* 8.03 8.63   RBC 4.27 4.25 4.17*   HGB 12.6 12.3 12.2   HCT 36.3* 35.9* 35.9*   MCV 85.0 84.5 86.1   MCH 29.5 28.9 29.3   MCHC 34.7 34.3 34.0   RDW 13.2 12.7 12.6   * 454* 464*   MPV 9.1 8.9 9.1       Recent Labs   Lab 08/07/24  1930 08/08/24  0453 08/09/24  0456 08/10/24  0650    141 141 142   K 3.3* 3.3* 3.7 3.7    105 109* 109*   CO2 25 27 23 24   BUN 11.6 8.6 6.7* 7.7   CREATININE 0.67 0.63 0.63 0.65   CALCIUM 9.2 8.8 9.0 9.2   MG  --  2.00 1.90 2.00   ALBUMIN 2.5*  --   --   --    ALKPHOS 127  --   --   --    ALT 17  --   --   --     AST 38*  --   --   --    BILITOT 0.4  --   --   --         Microbiology Results (last 7 days)       Procedure Component Value Units Date/Time    Body Fluid Culture [[id number]]  (Abnormal)  (Susceptibility) Collected: 08/08/24 0934    Order Status: Completed Specimen: Body Fluid from Knee, Left Updated: 08/11/24 0910     Body Fluid Culture Few Streptococcus agalactiae (Group B)    Anaerobic Culture [[id number]] Collected: 08/08/24 0934    Order Status: Completed Specimen: Body Fluid from Knee, Left Updated: 08/11/24 0644     Anaerobe Culture No Anaerobes Isolated    Blood Culture #1 **CANNOT BE ORDERED STAT** [[id number]]  (Normal) Collected: 08/07/24 1938    Order Status: Completed Specimen: Blood from Forearm, Left Updated: 08/10/24 2201     Blood Culture No Growth At 72 Hours    Blood Culture #2 **CANNOT BE ORDERED STAT** [[id number]]  (Normal) Collected: 08/07/24 1930    Order Status: Completed Specimen: Blood from Antecubital, Left Updated: 08/10/24 2201     Blood Culture No Growth At 72 Hours    AFB Smear [[id number]] Collected: 08/08/24 0934    Order Status: Completed Specimen: Synovial Fluid from Knee, Left Updated: 08/09/24 0702     AFB Smear No AFB seen (Direct smear only)    Gram Stain [[id number]] Collected: 08/08/24 0934    Order Status: Completed Specimen: Body Fluid from Knee, Left Updated: 08/08/24 1434     GRAM STAIN Many WBC observed      No bacteria seen             Radiology:  US Lower Extremity Veins Bilateral  EXAMINATION  US LOWER EXTREMITY VEINS BILATERAL    CLINICAL HISTORY  Rule out DVT, bilateral;    TECHNIQUE  Multiplanar grayscale sonographic evaluation of the bilateral lower extremity deep venous structures, with color/spectral Doppler interrogation compressive techniques to evaluate vessel patency.    COMPARISON  None available at the time of initial interpretation.    FINDINGS  Exam quality: adequate for evaluation    The interrogated bilateral lower extremity deep venous structures  demonstrate unremarkable gray scale morphology, compressibility, and spontaneous color flow.  Additionally, normal spectral waveforms are documented, with unremarkable augmentation and respiratory variation.    No focal abnormalities of the visualized nonvascular tissues are identified.    IMPRESSION  No sonographic evidence of deep vein thrombosis.    Electronically signed by: Eugene Koo  Date:    08/08/2024  Time:    11:24      Scheduled Med:   ceFEPime IV (PEDS and ADULTS)  2 g Intravenous Q8H    enoxparin  40 mg Subcutaneous Q24H (prophylaxis, 1700)    vancomycin (VANCOCIN) IV (PEDS and ADULTS)  1,250 mg Intravenous Q12H        Continuous Infusions:   0.9% NaCl   Intravenous Continuous 75 mL/hr at 08/10/24 1454 Rate Verify at 08/10/24 1454        PRN Meds:    Current Facility-Administered Medications:     acetaminophen, 650 mg, Oral, Q4H PRN    ALPRAzolam, 0.25 mg, Oral, TID PRN    dextrose 10%, 12.5 g, Intravenous, PRN    dextrose 10%, 25 g, Intravenous, PRN    glucagon (human recombinant), 1 mg, Intramuscular, PRN    glucose, 16 g, Oral, PRN    glucose, 24 g, Oral, PRN    HYDROcodone-acetaminophen, 1 tablet, Oral, Q4H PRN    HYDROcodone-acetaminophen, 1 tablet, Oral, Q4H PRN    melatonin, 6 mg, Oral, Nightly PRN    methocarbamoL, 750 mg, Oral, Q6H PRN    morphine, 4 mg, Intravenous, Q4H PRN    naloxone, 0.02 mg, Intravenous, PRN    ondansetron, 4 mg, Intravenous, Q8H PRN    senna-docusate 8.6-50 mg, 1 tablet, Oral, BID PRN    sodium chloride 0.9%, 10 mL, Intravenous, Q12H PRN    vancomycin - pharmacy to dose, , Intravenous, pharmacy to manage frequency     Nutrition Status:      Assessment/Plan:  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 I have reviewed or attempted to review medical records based upon their availability      _____________________________________________________________________            Kuldeep Reyes MD   08/11/2024

## 2024-08-11 NOTE — PLAN OF CARE
Problem: Physical Therapy  Goal: Physical Therapy Goal  Description:        Problem: Physical Therapy  Goal: Physical Therapy Goal  Description: Pt will improve functional independence by performing:    Sit to stand: with modified independence using RW  Ambulation x 200'  with modified independence with rolling walker  1 Step (Curb): modified independence  with rolling walker  3 Steps: with SBA with unilateral UE support from hand rail on L side only.        Outcome: Progressing

## 2024-08-11 NOTE — NURSING
Nurses Note -- 4 Eyes      8/10/2024   7:42 PM      Skin assessed during: Q Shift Change      [x] No Altered Skin Integrity Present    []Prevention Measures Documented      [] Yes- Altered Skin Integrity Present or Discovered   [] LDA Added if Not in Epic (Describe Wound)   [] New Altered Skin Integrity was Present on Admit and Documented in LDA   [] Wound Image Taken    Wound Care Consulted? No    Attending Nurse:  Mackenzie Garza RN/Staff Member:   ARNIE Erazo

## 2024-08-11 NOTE — PROGRESS NOTES
"No acute events overnight.  Pain controlled.  Resting in bed.     Vital Signs  Temp: 97.9 °F (36.6 °C)  Temp Source: Oral  Pulse: 76  Resp: 18  SpO2: 99 %  Device (Oxygen Therapy): room air  BP: 121/81  BP Location: Left arm  BP Method: Automatic  Height and Weight  Height: 5' 4" (162.6 cm)  Height Method: Stated  Weight: 68 kg (150 lb)  Weight Method: Stated  BSA (Calculated - sq m): 1.75 sq meters  BMI (Calculated): 25.7  Weight in (lb) to have BMI = 25: 145.3]    +FHL/EHL  BCR distally  Dressing c/d/i  SILT distally    Recent Lab Results       None            A/P:  infected L TKA  To OR tomorrow for revision L TKA  R/B/A discussed in detail  All questions answered  No guarantees made  NPO at MN    "

## 2024-08-11 NOTE — PROGRESS NOTES
Pharmacokinetic Assessment Follow Up: IV Vancomycin    Vancomycin serum concentration assessment(s):    The trough level was drawn correctly and can be used to guide therapy at this time. The measurement is above the desired definitive target range of 15 to 20 mcg/mL.    Vancomycin Regimen Plan:    Continue regimen to Vancomycin 1250 mg IV every 12 hours with next serum trough concentration measured at 1130 prior to the dose on 08/13    Scheduled Administration Times    0030  1230    Drug levels (last 3 results):  Recent Labs   Lab Result Units 08/09/24  2331 08/11/24  1126   Vancomycin Trough ug/ml 14.2* 20.5*       Vancomycin Administrations:  vancomycin given in the last 96 hours                     vancomycin 1,250 mg in D5W 250 mL IVPB (Vial-Mate) (mg) 1,250 mg New Bag 08/10/24 2346     1,250 mg New Bag  1218     1,250 mg New Bag  0036    vancomycin (VANCOCIN) 1,000 mg in D5W 250 mL IVPB (Vial-Mate) (mg) 1,000 mg New Bag 08/09/24 1020     1,000 mg New Bag 08/08/24 2032    vancomycin 1,500 mg in D5W 250 mL IVPB (Vial-Mate) (mg) 1,500 mg New Bag 08/08/24 1349                    Pharmacy will continue to follow and monitor vancomycin.    Please contact pharmacy at extension [phone] for questions regarding this assessment.    Thank you for the consult,   Flori Giles       Patient brief summary:  Jeanie Wong is a 39 y.o. female initiated on antimicrobial therapy with IV Vancomycin for treatment of bone/joint infection    The patient's current regimen is 1250mg q12h    Drug Allergies:   Review of patient's allergies indicates:   Allergen Reactions    Benadryl allergy decongestant Shortness Of Breath    Diphenhydramine hcl     Sulfa (sulfonamide antibiotics) Swelling       Actual Body Weight:  Wt Readings from Last 1 Encounters:   08/07/24 68 kg (150 lb)       Renal Function:   Estimated Creatinine Clearance: 109 mL/min (based on SCr of 0.65 mg/dL).,     Dialysis Method (if applicable):  N/A    CBC (last 72  hours):  Recent Labs   Lab Result Units 08/09/24  0456 08/10/24  0639   WBC x10(3)/mcL 8.03 8.63   Hgb g/dL 12.3 12.2   Hct % 35.9* 35.9*   Platelet x10(3)/mcL 454* 464*   Mono % % 8.5 7.8   Eos % % 6.2 3.9   Basophil % % 0.9 0.8       Metabolic Panel (last 72 hours):  Recent Labs   Lab Result Units 08/09/24  0456 08/10/24  0650   Sodium mmol/L 141 142   Potassium mmol/L 3.7 3.7   Chloride mmol/L 109* 109*   CO2 mmol/L 23 24   Glucose mg/dL 94 84   Blood Urea Nitrogen mg/dL 6.7* 7.7   Creatinine mg/dL 0.63 0.65   Magnesium Level mg/dL 1.90 2.00       Microbiologic Results:  Microbiology Results (last 7 days)       Procedure Component Value Units Date/Time    Body Fluid Culture [[id number]]  (Abnormal)  (Susceptibility) Collected: 08/08/24 0934    Order Status: Completed Specimen: Body Fluid from Knee, Left Updated: 08/11/24 0910     Body Fluid Culture Few Streptococcus agalactiae (Group B)    Anaerobic Culture [[id number]] Collected: 08/08/24 0934    Order Status: Completed Specimen: Body Fluid from Knee, Left Updated: 08/11/24 0644     Anaerobe Culture No Anaerobes Isolated    Blood Culture #1 **CANNOT BE ORDERED STAT** [[id number]]  (Normal) Collected: 08/07/24 1938    Order Status: Completed Specimen: Blood from Forearm, Left Updated: 08/10/24 2201     Blood Culture No Growth At 72 Hours    Blood Culture #2 **CANNOT BE ORDERED STAT** [[id number]]  (Normal) Collected: 08/07/24 1930    Order Status: Completed Specimen: Blood from Antecubital, Left Updated: 08/10/24 2201     Blood Culture No Growth At 72 Hours    AFB Smear [[id number]] Collected: 08/08/24 0934    Order Status: Completed Specimen: Synovial Fluid from Knee, Left Updated: 08/09/24 0702     AFB Smear No AFB seen (Direct smear only)    Gram Stain [[id number]] Collected: 08/08/24 0934    Order Status: Completed Specimen: Body Fluid from Knee, Left Updated: 08/08/24 1434     GRAM STAIN Many WBC observed      No bacteria seen

## 2024-08-11 NOTE — PT/OT/SLP EVAL
Physical Therapy Evaluation     Patient Name: Jeanie Wong   MRN: [id number]  Recent Surgery: Procedure(s) (LRB):  REVISION, ARTHROPLASTY, KNEE (Left)      Recommendations:     Discharge Recommendations: Moderate Intensity Therapy   Discharge Equipment Recommendations: bath bench, RW  Barriers to discharge: Ongoing medical treatment and Impaired functional mobility    Assessment:     Jeanie Wong is a 39 y.o. female admitted with a medical diagnosis of a fall resulting in a L knee effusion. She presents with the following impairments/functional limitations: weakness, decreased lower extremity function, decreased safety awareness, impaired endurance, pain, impaired functional mobility, gait instability, impaired self care skills.    Rehab Prognosis: Good; patient would benefit from acute PT services to address these deficits and reach maximum level of function.    Plan:     During this hospitalization, patient to be seen daily (QD-BID) to address the above listed problems via gait training, therapeutic activities, therapeutic exercises, neuromuscular re-education    Plan of Care Expires: 08/17/24    Subjective     Chief Complaint: I want to return home  Patient Comments/Goals: I want to return home  Pain/Comfort:  Pain Rating 1: 6/10  Location - Side 1: Left  Location 1: knee  Pain Addressed 1: Pre-medicate for activity, Distraction    Social History:  Living Environment: Patient lives alone in a mobile home with number of outside stair(s): 4 steps, with railing on L side only, and walk-in shower  Prior Level of Function: Prior to admission, patient was independent  Equipment Used at Home: crutches, axillary, walker, rolling  DME owned (not currently used): rolling walker, bedside commode, and shower chair  Assistance Upon Discharge: family    Objective:     Communicated with NSG prior to session. Patient found HOB elevated with peripheral IV upon PT entry to room.    General Precautions: Standard, fall    Orthopedic Precautions: LLE weight bearing as tolerated   Braces: N/A    Respiratory Status: Room air    Exams:  Cognitive: Patient is oriented to Person, Place, Time, Situation      Functional Mobility:  Gait belt applied - Yes  Bed Mobility  Rolling Left: independence  Supine to Sit: independence for LE management and trunk management  Sit to Supine: independence for LE management and trunk management  Transfers  Sit to Stand: stand by assistance with rolling walker  Gait  Patient ambulated 200 feet with rolling walker and stand by assistance. Patient required cues for upright posture to increase independence and safety. Patient required cues ~ 25% of the time.        Therapeutic Activities and Exercises:  Patient educated on role of acute care PT and PT POC, safety while in hospital including calling nurse for mobility, and call light usage.  Educated about importance of OOB mobility and remaining up in chair most of the day.      AM-PAC 6 CLICK MOBILITY  Total Score:     Patient left up in chair with all lines intact, call button in reach, RN notified, and RN present.    GOALS:   Multidisciplinary Problems       Physical Therapy Goals          Problem: Physical Therapy    Goal Priority Disciplines Outcome Goal Variances Interventions   Physical Therapy Goal     PT, PT/OT Progressing     Description:        Problem: Physical Therapy  Goal: Physical Therapy Goal  Description: Pt will improve functional independence by performing:    Sit to stand: SBA with rolling walker  Ambulation x 200'  with modified independence with rolling walker  1 Step (Curb): modified independence  with rolling walker  3 Steps: with SBA with unilateral UE support from hand rail on L side only.                             History:     Past Medical History:   Diagnosis Date    Cancer 2003    cervix    Hepatitis C        Past Surgical History:   Procedure Laterality Date    colonoscopy      ESOPHAGOGASTRODUODENOSCOPY      hemorhoidectomy       TONSILLECTOMY      TOTAL KNEE ARTHROPLASTY Left     TUBAL LIGATION         Time Tracking:     PT Received On: 08/11/24  PT Start Time: 0730  PT Stop Time: 0800  PT Total Time (min): 30 min     Billable Minutes: Evaluation 20 minutes and Gait Training 10 minutes.    8/11/2024

## 2024-08-11 NOTE — PLAN OF CARE
Problem: Adult Inpatient Plan of Care  Goal: Plan of Care Review  Outcome: Progressing  Goal: Patient-Specific Goal (Individualized)  Outcome: Progressing  Goal: Absence of Hospital-Acquired Illness or Injury  Outcome: Progressing  Goal: Optimal Comfort and Wellbeing  Outcome: Progressing  Goal: Readiness for Transition of Care  Outcome: Progressing     Problem: Knee Arthroplasty  Goal: Optimal Coping  Outcome: Progressing  Goal: Absence of Bleeding  Outcome: Progressing  Goal: Effective Bowel Elimination  Outcome: Progressing  Goal: Fluid and Electrolyte Balance  Outcome: Progressing  Goal: Optimal Functional Ability  Outcome: Progressing  Goal: Absence of Infection Signs and Symptoms  Outcome: Progressing  Goal: Intact Neurovascular Status  Outcome: Progressing  Goal: Anesthesia/Sedation Recovery  Outcome: Progressing  Goal: Optimal Pain Control and Function  Outcome: Progressing  Goal: Nausea and Vomiting Relief  Outcome: Progressing  Goal: Effective Urinary Elimination  Outcome: Progressing  Goal: Effective Oxygenation and Ventilation  Outcome: Progressing     Problem: Infection  Goal: Absence of Infection Signs and Symptoms  Outcome: Progressing     Problem: Pain Acute  Goal: Optimal Pain Control and Function  Outcome: Progressing     Problem: Fall Injury Risk  Goal: Absence of Fall and Fall-Related Injury  Outcome: Progressing     Problem: Fatigue  Goal: Improved Activity Tolerance  Outcome: Progressing     Problem: Anxiety  Goal: Anxiety Reduction or Resolution  Outcome: Progressing

## 2024-08-11 NOTE — NURSING
Nurses Note -- 4 Eyes      8/11/2024   6:50 AM      Skin assessed during: Q Shift Change      [x] No Altered Skin Integrity Present    []Prevention Measures Documented      [] Yes- Altered Skin Integrity Present or Discovered   [] LDA Added if Not in Epic (Describe Wound)   [] New Altered Skin Integrity was Present on Admit and Documented in LDA   [] Wound Image Taken    Wound Care Consulted? No    Attending Nurse:  Kacey Garza RN/Staff Member:   Mackenzie

## 2024-08-12 VITALS
DIASTOLIC BLOOD PRESSURE: 76 MMHG | RESPIRATION RATE: 18 BRPM | BODY MASS INDEX: 25.61 KG/M2 | HEIGHT: 64 IN | SYSTOLIC BLOOD PRESSURE: 117 MMHG | HEART RATE: 67 BPM | TEMPERATURE: 98 F | OXYGEN SATURATION: 99 % | WEIGHT: 150 LBS

## 2024-08-12 LAB
ALBUMIN SERPL-MCNC: 2.3 G/DL (ref 3.5–5)
ALBUMIN/GLOB SERPL: 0.7 RATIO (ref 1.1–2)
ALP SERPL-CCNC: 118 UNIT/L (ref 40–150)
ALT SERPL-CCNC: 14 UNIT/L (ref 0–55)
ANION GAP SERPL CALC-SCNC: 7 MEQ/L
AST SERPL-CCNC: 34 UNIT/L (ref 5–34)
BACTERIA BLD CULT: NORMAL
BACTERIA BLD CULT: NORMAL
BASOPHILS # BLD AUTO: 0.1 X10(3)/MCL
BASOPHILS NFR BLD AUTO: 1.2 %
BILIRUB SERPL-MCNC: 0.2 MG/DL
BUN SERPL-MCNC: 6.9 MG/DL (ref 7–18.7)
CALCIUM SERPL-MCNC: 8.7 MG/DL (ref 8.4–10.2)
CHLORIDE SERPL-SCNC: 112 MMOL/L (ref 98–107)
CO2 SERPL-SCNC: 24 MMOL/L (ref 22–29)
CREAT SERPL-MCNC: 0.59 MG/DL (ref 0.55–1.02)
CREAT/UREA NIT SERPL: 12
EOSINOPHIL # BLD AUTO: 0.35 X10(3)/MCL (ref 0–0.9)
EOSINOPHIL NFR BLD AUTO: 4.3 %
ERYTHROCYTE [DISTWIDTH] IN BLOOD BY AUTOMATED COUNT: 12.7 % (ref 11.5–17)
GFR SERPLBLD CREATININE-BSD FMLA CKD-EPI: >60 ML/MIN/1.73/M2
GLOBULIN SER-MCNC: 3.4 GM/DL (ref 2.4–3.5)
GLUCOSE SERPL-MCNC: 90 MG/DL (ref 74–100)
HCT VFR BLD AUTO: 34.4 % (ref 37–47)
HGB BLD-MCNC: 11.7 G/DL (ref 12–16)
IMM GRANULOCYTES # BLD AUTO: 0.12 X10(3)/MCL (ref 0–0.04)
IMM GRANULOCYTES NFR BLD AUTO: 1.5 %
LYMPHOCYTES # BLD AUTO: 2.65 X10(3)/MCL (ref 0.6–4.6)
LYMPHOCYTES NFR BLD AUTO: 32.8 %
MCH RBC QN AUTO: 29 PG (ref 27–31)
MCHC RBC AUTO-ENTMCNC: 34 G/DL (ref 33–36)
MCV RBC AUTO: 85.1 FL (ref 80–94)
MONOCYTES # BLD AUTO: 0.7 X10(3)/MCL (ref 0.1–1.3)
MONOCYTES NFR BLD AUTO: 8.7 %
NEUTROPHILS # BLD AUTO: 4.16 X10(3)/MCL (ref 2.1–9.2)
NEUTROPHILS NFR BLD AUTO: 51.5 %
NRBC BLD AUTO-RTO: 0 %
PLATELET # BLD AUTO: 550 X10(3)/MCL (ref 130–400)
PMV BLD AUTO: 8.8 FL (ref 7.4–10.4)
POTASSIUM SERPL-SCNC: 3.6 MMOL/L (ref 3.5–5.1)
PROT SERPL-MCNC: 5.7 GM/DL (ref 6.4–8.3)
RBC # BLD AUTO: 4.04 X10(6)/MCL (ref 4.2–5.4)
SODIUM SERPL-SCNC: 143 MMOL/L (ref 136–145)
VANCOMYCIN TROUGH SERPL-MCNC: 21.1 UG/ML (ref 15–20)
WBC # BLD AUTO: 8.08 X10(3)/MCL (ref 4.5–11.5)

## 2024-08-12 PROCEDURE — 63600175 PHARM REV CODE 636 W HCPCS: Performed by: INTERNAL MEDICINE

## 2024-08-12 PROCEDURE — 80053 COMPREHEN METABOLIC PANEL: CPT | Performed by: INTERNAL MEDICINE

## 2024-08-12 PROCEDURE — 80202 ASSAY OF VANCOMYCIN: CPT | Performed by: INTERNAL MEDICINE

## 2024-08-12 PROCEDURE — 25000003 PHARM REV CODE 250: Performed by: NURSE PRACTITIONER

## 2024-08-12 PROCEDURE — 85025 COMPLETE CBC W/AUTO DIFF WBC: CPT | Performed by: INTERNAL MEDICINE

## 2024-08-12 PROCEDURE — 25000003 PHARM REV CODE 250: Performed by: INTERNAL MEDICINE

## 2024-08-12 PROCEDURE — 63600175 PHARM REV CODE 636 W HCPCS: Performed by: NURSE PRACTITIONER

## 2024-08-12 PROCEDURE — 99499 UNLISTED E&M SERVICE: CPT | Mod: ,,, | Performed by: ORTHOPAEDIC SURGERY

## 2024-08-12 PROCEDURE — 36415 COLL VENOUS BLD VENIPUNCTURE: CPT | Performed by: INTERNAL MEDICINE

## 2024-08-12 PROCEDURE — S4991 NICOTINE PATCH NONLEGEND: HCPCS | Performed by: INTERNAL MEDICINE

## 2024-08-12 RX ADMIN — CEFEPIME 2 G: 2 INJECTION, POWDER, FOR SOLUTION INTRAVENOUS at 05:08

## 2024-08-12 RX ADMIN — ALPRAZOLAM 0.5 MG: 0.25 TABLET ORAL at 12:08

## 2024-08-12 RX ADMIN — CEFEPIME 2 G: 2 INJECTION, POWDER, FOR SOLUTION INTRAVENOUS at 11:08

## 2024-08-12 RX ADMIN — NICOTINE 1 PATCH: 14 PATCH, EXTENDED RELEASE TRANSDERMAL at 08:08

## 2024-08-12 RX ADMIN — ALPRAZOLAM 0.5 MG: 0.25 TABLET ORAL at 08:08

## 2024-08-12 RX ADMIN — VANCOMYCIN HYDROCHLORIDE 1250 MG: 1.25 INJECTION, POWDER, LYOPHILIZED, FOR SOLUTION INTRAVENOUS at 12:08

## 2024-08-12 RX ADMIN — HYDROCODONE BITARTRATE AND ACETAMINOPHEN 1 TABLET: 7.5; 325 TABLET ORAL at 05:08

## 2024-08-12 RX ADMIN — HYDROCODONE BITARTRATE AND ACETAMINOPHEN 1 TABLET: 5; 325 TABLET ORAL at 09:08

## 2024-08-12 NOTE — NURSING
Patient walked to nurses station stating she was leaving.  Despite efforts by myself and charge nurse she decided to leave against medical advice and full decisional capacity.  She understands her condition and the risks of leaving AMA, including but not limited too loss of limb, loss of life and limb complications.  She has been given the opportunity to ask questions about her medical care.  She has been informed that she may return for care at any time.  Patient removed 20G LFA IV prior to leaving her room.  AMA paper completed and signed.

## 2024-08-12 NOTE — PROGRESS NOTES
"No acute events overnight.  Pain controlled.  Resting in bed. NPO for surgeryt diandra    Vital Signs  Temp: 97.9 °F (36.6 °C)  Temp Source: Oral  Pulse: 67  Resp: 18  SpO2: 99 %  Device (Oxygen Therapy): room air  BP: 117/76  BP Location: Left arm  BP Method: Automatic  Height and Weight  Height: 5' 4" (162.6 cm)  Height Method: Stated  Weight: 68 kg (150 lb)  Weight Method: Stated  BSA (Calculated - sq m): 1.75 sq meters  BMI (Calculated): 25.7  Weight in (lb) to have BMI = 25: 145.3]    +FHL/EHL  BCR distally  Swelling to L knee    Recent Lab Results         08/12/24  1208   08/12/24  0506        Albumin/Globulin Ratio   0.7       Albumin   2.3       ALP   118       ALT   14       Anion Gap   7.0       AST   34       Baso #   0.10       Basophil %   1.2       BILIRUBIN TOTAL   0.2       BUN   6.9       BUN/CREAT RATIO   12       Calcium   8.7       Chloride   112       CO2   24       Creatinine   0.59       eGFR   >60       Eos #   0.35       Eos %   4.3       Globulin, Total   3.4       Glucose   90       Hematocrit   34.4       Hemoglobin   11.7       Immature Grans (Abs)   0.12       Immature Granulocytes   1.5       Lymph #   2.65       LYMPH %   32.8       MCH   29.0       MCHC   34.0       MCV   85.1       Mono #   0.70       Mono %   8.7       MPV   8.8       Neut #   4.16       Neut %   51.5       nRBC   0.0       Platelet Count   550       Potassium   3.6       PROTEIN TOTAL   5.7       RBC   4.04       RDW   12.7       Sodium   143       Vancomycin-Trough 21.1         WBC   8.08               A/P:  Status post aspiration L knee with acute infection  R/B/A discussed in detail  All questions answered to patient's satisfaction  Plan for revision L TKA  NPO for surgery today    "

## 2024-08-12 NOTE — PROGRESS NOTES
Pharmacokinetic Assessment Follow Up: IV Vancomycin    Vancomycin serum concentration assessment(s):    The trough level was drawn correctly and can be used to guide therapy at this time. The measurement is above the desired definitive target range of 15 to 20 mcg/mL.    Vancomycin Regimen Plan:    Change regimen to Vancomycin 1000 mg IV every 12 hours with next serum trough concentration measured at 1400 prior to 5th dose on 08/14.    Drug levels (last 3 results):  Recent Labs   Lab Result Units 08/09/24  2331 08/11/24  1126 08/12/24  1208   Vancomycin Trough ug/ml 14.2* 20.5* 21.1*       Pharmacy will continue to follow and monitor vancomycin.    Please contact pharmacy at extension [phone] for questions regarding this assessment.    Thank you for the consult,   Alana Pritchett       Patient brief summary:  Jeanie Wong is a 39 y.o. female initiated on antimicrobial therapy with IV Vancomycin for treatment of bone/joint infection    The patient's current regimen is 1250mg q12h.    Drug Allergies:   Review of patient's allergies indicates:   Allergen Reactions    Benadryl allergy decongestant Shortness Of Breath    Diphenhydramine hcl     Sulfa (sulfonamide antibiotics) Swelling       Actual Body Weight:   68kg    Renal Function:   Estimated Creatinine Clearance: 120.1 mL/min (based on SCr of 0.59 mg/dL).,     Dialysis Method (if applicable):  N/A    CBC (last 72 hours):  Recent Labs   Lab Result Units 08/10/24  0639 08/12/24  0506   WBC x10(3)/mcL 8.63 8.08   Hgb g/dL 12.2 11.7*   Hct % 35.9* 34.4*   Platelet x10(3)/mcL 464* 550*   Mono % % 7.8 8.7   Eos % % 3.9 4.3   Basophil % % 0.8 1.2       Metabolic Panel (last 72 hours):  Recent Labs   Lab Result Units 08/10/24  0650 08/12/24  0506   Sodium mmol/L 142 143   Potassium mmol/L 3.7 3.6   Chloride mmol/L 109* 112*   CO2 mmol/L 24 24   Glucose mg/dL 84 90   Blood Urea Nitrogen mg/dL 7.7 6.9*   Creatinine mg/dL 0.65 0.59   Albumin g/dL  --  2.3*   Bilirubin Total mg/dL   --  0.2   ALP unit/L  --  118   AST unit/L  --  34   ALT unit/L  --  14   Magnesium Level mg/dL 2.00  --        Vancomycin Administrations:  vancomycin given in the last 96 hours                     vancomycin 1,250 mg in D5W 250 mL IVPB (Vial-Mate) (mg) 1,250 mg New Bag 08/12/24 0016     1,250 mg New Bag 08/11/24 1308      Restarted  0019     1,250 mg New Bag 08/10/24 2346     1,250 mg New Bag  1218     1,250 mg New Bag  0036    vancomycin (VANCOCIN) 1,000 mg in D5W 250 mL IVPB (Vial-Mate) (mg) 1,000 mg New Bag 08/09/24 1020     1,000 mg New Bag 08/08/24 2032    vancomycin 1,500 mg in D5W 250 mL IVPB (Vial-Mate) (mg) 1,500 mg New Bag 08/08/24 1349                    Microbiologic Results:  Microbiology Results (last 7 days)       Procedure Component Value Units Date/Time    Blood Culture #1 **CANNOT BE ORDERED STAT** [[id number]]  (Normal) Collected: 08/07/24 1938    Order Status: Completed Specimen: Blood from Forearm, Left Updated: 08/11/24 2200     Blood Culture No Growth At 96 Hours    Blood Culture #2 **CANNOT BE ORDERED STAT** [[id number]]  (Normal) Collected: 08/07/24 1930    Order Status: Completed Specimen: Blood from Antecubital, Left Updated: 08/11/24 2200     Blood Culture No Growth At 96 Hours    Body Fluid Culture [[id number]]  (Abnormal)  (Susceptibility) Collected: 08/08/24 0934    Order Status: Completed Specimen: Body Fluid from Knee, Left Updated: 08/11/24 0910     Body Fluid Culture Few Streptococcus agalactiae (Group B)    Anaerobic Culture [[id number]] Collected: 08/08/24 0934    Order Status: Completed Specimen: Body Fluid from Knee, Left Updated: 08/11/24 0644     Anaerobe Culture No Anaerobes Isolated    AFB Smear [[id number]] Collected: 08/08/24 0934    Order Status: Completed Specimen: Synovial Fluid from Knee, Left Updated: 08/09/24 0702     AFB Smear No AFB seen (Direct smear only)    Gram Stain [[id number]] Collected: 08/08/24 0934    Order Status: Completed Specimen: Body Fluid from  Knee, Left Updated: 08/08/24 1434     GRAM STAIN Many WBC observed      No bacteria seen

## 2024-08-12 NOTE — NURSING
Nurses Note -- 4 Eyes      8/11/2024   7:43 PM      Skin assessed during: Q Shift Change      [x] No Altered Skin Integrity Present    []Prevention Measures Documented      [] Yes- Altered Skin Integrity Present or Discovered   [] LDA Added if Not in Epic (Describe Wound)   [] New Altered Skin Integrity was Present on Admit and Documented in LDA   [] Wound Image Taken    Wound Care Consulted? No    Attending Nurse:  Mackenzie Garza RN/Staff Member:   ARNIE Erazo

## 2024-08-12 NOTE — NURSING
Nurses Note -- 4 Eyes      8/12/2024   07:03 AM      Skin assessed during: Q Shift Change      [x] No Altered Skin Integrity Present    []Prevention Measures Documented      [] Yes- Altered Skin Integrity Present or Discovered   [] LDA Added if Not in Epic (Describe Wound)   [] New Altered Skin Integrity was Present on Admit and Documented in LDA   [] Wound Image Taken    Wound Care Consulted? No    Attending Nurse:  Kacey Garza RN/Staff Member:   Mackenzie

## 2024-08-12 NOTE — PLAN OF CARE
Prior to surgery, pt decided to leave AMA. Stating she wanted to leave & smoke. Nursing staff attempted to encourage her to stay for surgery -- but pt departed with belongings.

## 2024-08-14 LAB
CRYSTAL, BODY FLUID (OHS): NORMAL
CRYSTAL, BODY FLUID TYPE (OHS): NORMAL
SLIDE CRYSTALS (OHS): NORMAL

## 2024-08-20 ENCOUNTER — TELEPHONE (OUTPATIENT)
Dept: ORTHOPEDICS | Facility: CLINIC | Age: 39
End: 2024-08-20
Payer: MEDICAID

## 2024-08-20 NOTE — TELEPHONE ENCOUNTER
I return call to patient and let her know to go and check in at the ER today before our clinic close at 5pm and call me so I can let Dr. Sheridan she is there and we can get her on the surgery schedule for tomorrow. Patient understood verbal instructions and said she would go before 5pm.

## 2024-08-21 ENCOUNTER — DOCUMENTATION ONLY (OUTPATIENT)
Dept: ORTHOPEDICS | Facility: CLINIC | Age: 39
End: 2024-08-21
Payer: MEDICAID

## 2024-08-21 NOTE — PROGRESS NOTES
Patient was contacted multiple times including last week as well as Monday and Tuesday/20.  He was treated with the patient throughout these discussions that she should present to the emergency department given her active infection to her left knee.  Patient has with a explained that there is always a risk of systemic transfer and sepsis.  Patient has yet to present to the emergency department

## 2024-08-22 NOTE — DISCHARGE SUMMARY
Hospital Medicine Discharge Summary    Admit Date: 8/7/2024  Discharge Date and Time: 8/22/20243:25 PM  Admitting Physician: Kuldeep Reyes MD   Discharge Attending Physician: Kuldeep Reyes.  Primary Care Physician: Suzy, Primary Doctor  Consults: Dr. Sheridan Orthopedic Surgery    Discharge Diagnoses:  Active Hospital Problems    Diagnosis  POA    Acute pain of left knee [M25.562]  Yes    Effusion of left knee [M25.462]  Yes      Resolved Hospital Problems   No resolved problems to display.        Hospital Course:   Patient is a 39-year-old woman with history of hepatitis C, asthma, nicotine dependence, daily vaping, left knee replacement who had arrived at the emergency room with complaints of worsening left knee pain.     One and half weeks ago patient had fallen while walking down the stairs landed on her left knee where she had previously had a knee replacement.  Initially it bothered her for about 6 hours and was unable to walk.  She had gone to [facility] Emergency room at which time she had been sent home with a brace.  She had been using the narcotic pain medications that she has been given but had been having difficulty with worsening pain, swelling and redness of that specific knee.  She had recently run out of the pain medications and was no longer able to control her pain follow-up and walk on her leg without the crutches.     Patient denies any fevers or chills.  Patient denies any nausea or vomiting.  Patient does admit to some issues with mild constipation but denies any issues of diarrhea.     Initial vital signs in the emergency room temperature of 97.7 °F, 111 bpm, 20 respirations a minute, 111/68 mmHg 98% on room air.  Patient was 68 kg.     Review of patient's laboratory studies patient has white blood cell count of 15.5, hemoglobin of 12.5, MCV of 85.3 with a platelet count of 412.  Neutrophil predominance of 69.4%.     ESR is 64, high-sensitivity CRP is 115.72.  Lactic acid 2.0.    "  Patient sodium is 139, potassium 3.3, chloride 101, carbon dioxide 25, BUN/creatinine 11.6/0.67 glucose of 104.  Calcium of 9.2.  Total protein/albumin 6.7/2.5.  LFTs unremarkable and noncontributory.     CT of the knee without contrast on the left shows no acute osseous findings but does show evidence of moderate knee effusion.     Patient admitted to the hospital for concerns of septic joint.  However, after initial review of patient's laboratory studies and clinical case orthopedics will like to hold off on IV antibiotics for now until arthrocentesis can be done in the morning.  Pt underwent arthrocentesis, antibiotics started, cxs growing streptococcus agalactiae, monitored and planned surgery but pt left ama before surgery.    /76   Pulse 67   Temp 97.9 °F (36.6 °C) (Oral)   Resp 18   Ht 5' 4" (1.626 m)   Wt 68 kg (150 lb)   LMP 07/28/2024 (Exact Date)   SpO2 99%   Breastfeeding No   BMI 25.75 kg/m²    Physical Exam   General: In no acute distress, afebrile  Chest: Clear to auscultation bilaterally  Heart: RRR, +S1, S2, no appreciable murmur  Abdomen: Soft, nontender, BS +  MSK: left knee swelling/effusion, tender, warm, erythema  Neurologic: Alert and oriented x4, Cranial nerve II-XII intact      Procedures Performed: arthrocentesis    Significant Diagnostic Studies: See Full reports for all details  No results displayed because visit has over 200 results.           X-Ray Chest 1 View    Result Date: 8/12/2024  EXAMINATION: XR CHEST 1 VIEW CLINICAL HISTORY: pre surgery; TECHNIQUE: Single frontal view of the chest was performed. COMPARISON: 12/22/2018 FINDINGS: The lungs are clear, with normal appearance of pulmonary vasculature and no pleural effusion or pneumothorax. The cardiac silhouette is normal in size. The hilar and mediastinal contours are unremarkable. Bones are intact.     No acute abnormality. Electronically signed by: Aguilar Gonzalez MD Date:    08/12/2024 Time:    11:03    US Lower " Extremity Veins Bilateral    Result Date: 8/8/2024  EXAMINATION US LOWER EXTREMITY VEINS BILATERAL CLINICAL HISTORY Rule out DVT, bilateral; TECHNIQUE Multiplanar grayscale sonographic evaluation of the bilateral lower extremity deep venous structures, with color/spectral Doppler interrogation compressive techniques to evaluate vessel patency. COMPARISON None available at the time of initial interpretation. FINDINGS Exam quality: adequate for evaluation The interrogated bilateral lower extremity deep venous structures demonstrate unremarkable gray scale morphology, compressibility, and spontaneous color flow.  Additionally, normal spectral waveforms are documented, with unremarkable augmentation and respiratory variation. No focal abnormalities of the visualized nonvascular tissues are identified. IMPRESSION No sonographic evidence of deep vein thrombosis. Electronically signed by: Eugene Koo Date:    08/08/2024 Time:    11:24    CT Knee Without Contrast Left    Result Date: 8/7/2024  EXAMINATION: CT KNEE WITHOUT CONTRAST LEFT CLINICAL HISTORY: Knee replacement, loosening suspected;Knee replacement, periprosthetic fracture suspected; TECHNIQUE: Noncontrast CT imaging of the left knee.  Axial, coronal and sagittal reformatted images reviewed.   Dose length product is 226 mGycm. Automatic exposure control, adjustment of mA/kV or iterative reconstruction technique used to limit radiation dose. COMPARISON: Radiographs 07/29/2024 FINDINGS: Previous total knee arthroplasty.  Hardware degrades image quality, but no defined acute fracture or dislocation identified.  Moderate knee effusion.  Prepatellar edema with additional subcutaneous fluid lateral to the proximal tibia.     No acute osseous findings identified.  Moderate knee effusion. Electronically signed by: Master Cox Date:    08/07/2024 Time:    18:24    X-Ray Knee Complete 4 or More Views Left    Result Date: 7/29/2024  EXAMINATION: XR KNEE COMP 4 OR MORE  VIEWS LEFT CLINICAL HISTORY: Unspecified fall, initial encounter COMPARISON: 3 May 2023 FINDINGS: Four views of the left knee.  There is no fracture or dislocation.  There is arthroplasty without evidence of loosening.  There may be a small suprapatellar joint effusion.     No acute osseous findings.  Possible small joint effusion. Electronically signed by: Dawson Urbina Date:    07/29/2024 Time:    10:08      Microbiology Results (last 7 days)       Procedure Component Value Units Date/Time    Body Fluid Culture [[id number]]  (Abnormal)  (Susceptibility) Collected: 08/08/24 0934    Order Status: Completed Specimen: Body Fluid from Knee, Left Updated: 08/11/24 0910     Body Fluid Culture Few Streptococcus agalactiae (Group B)    Anaerobic Culture [[id number]] Collected: 08/08/24 0934    Order Status: Completed Specimen: Body Fluid from Knee, Left Updated: 08/11/24 0644     Anaerobe Culture No Anaerobes Isolated    AFB Smear [[id number]] Collected: 08/08/24 0934    Order Status: Completed Specimen: Synovial Fluid from Knee, Left Updated: 08/09/24 0702     AFB Smear No AFB seen (Direct smear only)    Gram Stain [[id number]] Collected: 08/08/24 0934    Order Status: Completed Specimen: Body Fluid from Knee, Left Updated: 08/08/24 1434     GRAM STAIN Many WBC observed      No bacteria seen                Medication List        ASK your doctor about these medications      acetaminophen 325 MG tablet  Commonly known as: TYLENOL  Take 2 tablets (650 mg total) by mouth every 8 (eight) hours as needed for Pain.     albuterol 90 mcg/actuation inhaler  Commonly known as: PROVENTIL/VENTOLIN HFA     aspirin 81 MG Chew     ciprofloxacin HCl 500 MG tablet  Commonly known as: CIPRO  Take 1 tablet (500 mg total) by mouth every 12 (twelve) hours.     ferrous sulfate 325 mg (65 mg iron) Tab tablet  Commonly known as: FEOSOL     HYDROcodone-acetaminophen 7.5-325 mg per tablet  Commonly known as: NORCO  Take 1 tablet by mouth every 6  (six) hours as needed for Pain.     meloxicam 15 MG tablet  Commonly known as: MOBIC  Take 1 tablet (15 mg total) by mouth once daily.     ondansetron 4 MG tablet  Commonly known as: ZOFRAN     valACYclovir 1000 MG tablet  Commonly known as: VALTREX               Follow-up left ama  For further questions contact hospitalist office    Discharge time 20 minutes    For worsening symptoms, chest pain, shortness of breath, increased abdominal pain, high grade fever, stroke or stroke like symptoms, immediately go to the nearest Emergency Room or call 911 as soon as possible.      Kuldeep Huston M.D, on 8/22/2024. at 3:25 PM.

## 2024-09-06 ENCOUNTER — LAB VISIT (OUTPATIENT)
Dept: LAB | Facility: HOSPITAL | Age: 39
End: 2024-09-06
Attending: FAMILY MEDICINE
Payer: MEDICAID

## 2024-09-06 DIAGNOSIS — Z00.00 ROUTINE GENERAL MEDICAL EXAMINATION AT A HEALTH CARE FACILITY: Primary | ICD-10-CM

## 2024-09-06 LAB
ALBUMIN SERPL-MCNC: 3.8 G/DL (ref 3.5–5)
ALBUMIN/GLOB SERPL: 1 RATIO (ref 1.1–2)
ALP SERPL-CCNC: 119 UNIT/L (ref 40–150)
ALT SERPL-CCNC: 17 UNIT/L (ref 0–55)
ANION GAP SERPL CALC-SCNC: 8 MEQ/L
AST SERPL-CCNC: 30 UNIT/L (ref 5–34)
BASOPHILS # BLD AUTO: 0.11 X10(3)/MCL
BASOPHILS NFR BLD AUTO: 1.3 %
BILIRUB SERPL-MCNC: 0.4 MG/DL
BUN SERPL-MCNC: 9.5 MG/DL (ref 7–18.7)
CALCIUM SERPL-MCNC: 10.2 MG/DL (ref 8.4–10.2)
CHLORIDE SERPL-SCNC: 103 MMOL/L (ref 98–107)
CO2 SERPL-SCNC: 28 MMOL/L (ref 22–29)
CREAT SERPL-MCNC: 0.83 MG/DL (ref 0.55–1.02)
CREAT/UREA NIT SERPL: 11
EOSINOPHIL # BLD AUTO: 0.62 X10(3)/MCL (ref 0–0.9)
EOSINOPHIL NFR BLD AUTO: 7.6 %
ERYTHROCYTE [DISTWIDTH] IN BLOOD BY AUTOMATED COUNT: 13.3 % (ref 11.5–17)
GFR SERPLBLD CREATININE-BSD FMLA CKD-EPI: >60 ML/MIN/1.73/M2
GLOBULIN SER-MCNC: 3.9 GM/DL (ref 2.4–3.5)
GLUCOSE SERPL-MCNC: 78 MG/DL (ref 74–100)
HCT VFR BLD AUTO: 42.6 % (ref 37–47)
HGB BLD-MCNC: 14 G/DL (ref 12–16)
IMM GRANULOCYTES # BLD AUTO: 0.03 X10(3)/MCL (ref 0–0.04)
IMM GRANULOCYTES NFR BLD AUTO: 0.4 %
LYMPHOCYTES # BLD AUTO: 3.71 X10(3)/MCL (ref 0.6–4.6)
LYMPHOCYTES NFR BLD AUTO: 45.4 %
MCH RBC QN AUTO: 28.9 PG (ref 27–31)
MCHC RBC AUTO-ENTMCNC: 32.9 G/DL (ref 33–36)
MCV RBC AUTO: 87.8 FL (ref 80–94)
MONOCYTES # BLD AUTO: 0.54 X10(3)/MCL (ref 0.1–1.3)
MONOCYTES NFR BLD AUTO: 6.6 %
NEUTROPHILS # BLD AUTO: 3.17 X10(3)/MCL (ref 2.1–9.2)
NEUTROPHILS NFR BLD AUTO: 38.7 %
NRBC BLD AUTO-RTO: 0 %
PLATELET # BLD AUTO: 339 X10(3)/MCL (ref 130–400)
PMV BLD AUTO: 10 FL (ref 7.4–10.4)
POTASSIUM SERPL-SCNC: 4.3 MMOL/L (ref 3.5–5.1)
PROT SERPL-MCNC: 7.7 GM/DL (ref 6.4–8.3)
RBC # BLD AUTO: 4.85 X10(6)/MCL (ref 4.2–5.4)
SODIUM SERPL-SCNC: 139 MMOL/L (ref 136–145)
T PALLIDUM AB SER QL: NONREACTIVE
WBC # BLD AUTO: 8.18 X10(3)/MCL (ref 4.5–11.5)

## 2024-09-06 PROCEDURE — 86780 TREPONEMA PALLIDUM: CPT

## 2024-09-06 PROCEDURE — 36415 COLL VENOUS BLD VENIPUNCTURE: CPT

## 2024-09-06 PROCEDURE — 85025 COMPLETE CBC W/AUTO DIFF WBC: CPT

## 2024-09-06 PROCEDURE — 80053 COMPREHEN METABOLIC PANEL: CPT

## 2024-11-07 ENCOUNTER — HOSPITAL ENCOUNTER (EMERGENCY)
Facility: HOSPITAL | Age: 39
Discharge: LEFT BEFORE DISCHARGE | End: 2024-11-07
Attending: INTERNAL MEDICINE

## 2024-11-07 VITALS
OXYGEN SATURATION: 98 % | RESPIRATION RATE: 20 BRPM | DIASTOLIC BLOOD PRESSURE: 72 MMHG | BODY MASS INDEX: 26 KG/M2 | WEIGHT: 152.31 LBS | TEMPERATURE: 97 F | HEIGHT: 64 IN | SYSTOLIC BLOOD PRESSURE: 145 MMHG | HEART RATE: 103 BPM

## 2024-11-07 DIAGNOSIS — B96.89 BACTERIAL VAGINITIS: ICD-10-CM

## 2024-11-07 DIAGNOSIS — N76.0 BACTERIAL VAGINITIS: ICD-10-CM

## 2024-11-07 DIAGNOSIS — N30.00 ACUTE CYSTITIS WITHOUT HEMATURIA: Primary | ICD-10-CM

## 2024-11-07 LAB
B-HCG UR QL: NEGATIVE
BACTERIA #/AREA URNS AUTO: ABNORMAL /HPF
BILIRUB UR QL STRIP.AUTO: NEGATIVE
C TRACH DNA SPEC QL NAA+PROBE: NOT DETECTED
CAOX CRY UR QL COMP ASSIST: ABNORMAL
CLARITY UR: CLEAR
CLUE CELLS VAG QL WET PREP: ABNORMAL
COLOR UR AUTO: YELLOW
CTP QC/QA: YES
GLUCOSE UR QL STRIP: NORMAL
HGB UR QL STRIP: NEGATIVE
HYALINE CASTS #/AREA URNS LPF: ABNORMAL /LPF
KETONES UR QL STRIP: ABNORMAL
LEUKOCYTE ESTERASE UR QL STRIP: 250
MUCOUS THREADS URNS QL MICRO: ABNORMAL /LPF
N GONORRHOEA DNA SPEC QL NAA+PROBE: NOT DETECTED
NITRITE UR QL STRIP: NEGATIVE
PH UR STRIP: 6 [PH]
PROT UR QL STRIP: ABNORMAL
RBC #/AREA URNS AUTO: ABNORMAL /HPF
SARS-COV-2 RDRP RESP QL NAA+PROBE: NEGATIVE
SOURCE (OHS): NORMAL
SP GR UR STRIP.AUTO: 1.03 (ref 1–1.03)
SQUAMOUS #/AREA URNS LPF: ABNORMAL /HPF
T VAGINALIS VAG QL WET PREP: ABNORMAL
UROBILINOGEN UR STRIP-ACNC: ABNORMAL
WBC #/AREA URNS AUTO: ABNORMAL /HPF
WBC #/AREA VAG WET PREP: ABNORMAL
YEAST SPEC QL WET PREP: ABNORMAL

## 2024-11-07 PROCEDURE — 81001 URINALYSIS AUTO W/SCOPE: CPT | Performed by: PHYSICIAN ASSISTANT

## 2024-11-07 PROCEDURE — U0002 COVID-19 LAB TEST NON-CDC: HCPCS

## 2024-11-07 PROCEDURE — 81025 URINE PREGNANCY TEST: CPT | Performed by: PHYSICIAN ASSISTANT

## 2024-11-07 PROCEDURE — 87210 SMEAR WET MOUNT SALINE/INK: CPT

## 2024-11-07 PROCEDURE — 87591 N.GONORRHOEAE DNA AMP PROB: CPT | Performed by: PHYSICIAN ASSISTANT

## 2024-11-07 PROCEDURE — 99284 EMERGENCY DEPT VISIT MOD MDM: CPT

## 2024-11-07 RX ORDER — METRONIDAZOLE 500 MG/1
500 TABLET ORAL EVERY 12 HOURS
Qty: 14 TABLET | Refills: 0 | Status: SHIPPED | OUTPATIENT
Start: 2024-11-07 | End: 2024-11-14

## 2024-11-07 RX ORDER — NITROFURANTOIN 25; 75 MG/1; MG/1
100 CAPSULE ORAL 2 TIMES DAILY
Qty: 14 CAPSULE | Refills: 0 | Status: SHIPPED | OUTPATIENT
Start: 2024-11-07 | End: 2024-11-14

## 2024-11-08 NOTE — ED PROVIDER NOTES
Encounter Date: 11/7/2024       History     Chief Complaint   Patient presents with    Nasal Congestion     X1 week    Dysuria     Dysuria and vag discharge     39-year-old female presents to the emergency department with complaints of nasal congestion, dysuria and vaginal discharge x1 week.    The history is provided by the patient. No  was used.     Review of patient's allergies indicates:   Allergen Reactions    Benadryl allergy decongestant Shortness Of Breath    Diphenhydramine hcl     Sulfa (sulfonamide antibiotics) Swelling     Past Medical History:   Diagnosis Date    Cancer 2003    cervix    Hepatitis C      Past Surgical History:   Procedure Laterality Date    colonoscopy      ESOPHAGOGASTRODUODENOSCOPY      hemorhoidectomy      TONSILLECTOMY      TOTAL KNEE ARTHROPLASTY Left     TUBAL LIGATION       Family History   Problem Relation Name Age of Onset    Cancer Mother      Cancer Maternal Aunt       Social History     Tobacco Use    Smoking status: Every Day     Current packs/day: 0.50     Average packs/day: 0.5 packs/day for 13.0 years (6.5 ttl pk-yrs)     Types: Cigarettes, Vaping with nicotine    Smokeless tobacco: Never   Substance Use Topics    Alcohol use: Not Currently     Alcohol/week: 2.0 standard drinks of alcohol     Types: 1 Glasses of wine, 1 Cans of beer per week    Drug use: Not Currently     Comment: 1.5 yrs clean     Review of Systems   Constitutional:  Negative for fever.   HENT:  Positive for congestion.    Gastrointestinal:  Negative for abdominal pain, nausea and vomiting.   Genitourinary:  Positive for dysuria and vaginal discharge.   Skin:  Negative for rash.       Physical Exam     Initial Vitals [11/07/24 1939]   BP Pulse Resp Temp SpO2   (!) 145/72 103 20 97 °F (36.1 °C) 98 %      MAP       --         Physical Exam    Nursing note and vitals reviewed.  Constitutional: She appears well-developed and well-nourished.   Cardiovascular:  Normal rate, regular rhythm,  normal heart sounds and intact distal pulses.           Pulmonary/Chest: Breath sounds normal.   Abdominal: Abdomen is soft. Bowel sounds are normal.     Skin: Capillary refill takes less than 2 seconds.         ED Course   Procedures  Labs Reviewed   WET PREP, GENITAL - Abnormal       Result Value    WBC, Wet Prep Rare (*)     Clue Cells, Wet Prep Rare (*)     Trichomonas, Wet Prep None Seen      Yeast, Wet Prep None Seen     URINALYSIS, REFLEX TO URINE CULTURE - Abnormal    Color, UA Yellow      Appearance, UA Clear      Specific Gravity, UA 1.029      pH, UA 6.0      Protein, UA Trace (*)     Glucose, UA Normal      Ketones, UA Trace (*)     Blood, UA Negative      Bilirubin, UA Negative      Urobilinogen, UA 1+ (*)     Nitrites, UA Negative      Leukocyte Esterase,  (*)     RBC, UA 6-10 (*)     WBC, UA 6-10 (*)     Bacteria, UA None Seen      Squamous Epithelial Cells, UA Occasional (*)     Mucous, UA Few (*)     Hyaline Casts, UA 0-2 (*)     Calcium Oxalate Crystals, UA Moderate (*)    SARS-COV-2 RNA AMPLIFICATION, QUAL - Normal    SARS COV-2 Molecular Negative      Narrative:     The IDNOW COVID-19 assay is a rapid molecular in vitro diagnostic test utilizing an isothermal nucleic acid amplification technology intended for the qualitative detection of nucleic acid from the SARS-CoV-2 viral RNA in direct nasal, nasopharyngeal or throat swabs from individuals who are suspected of COVID-19 by their healthcare provider.   CHLAMYDIA/GONORRHOEAE(GC), PCR    Chlamydia trachomatis PCR Not Detected      N. gonorrhea PCR Not Detected      Source Urine      Narrative:     The Xpert CT/NG test, performed on the GeneXpert system is a qualitative in vitro real-time polymerase chain reaction (PCR) test for the automated detected and differentiation for genomic DNA from Chlamydia trachomatis (CT) and/or Neisseria gonorrhoeae (NG).   POCT URINE PREGNANCY    POC Preg Test, Ur Negative       Acceptable Yes             Imaging Results    None          Medications - No data to display  Medical Decision Making  39-year-old female presents to the emergency department with complaints of nasal congestion, dysuria and vaginal discharge x1 week.    DDx:  COVID, influenza, RSV, UTI, STI, bacterial vaginosis    Clue cells positive on wet prep.  Urinalysis concerning for UTI.  G/C negative.  Prescribed Flagyl and Macrobid.  Patient eloped prior to results.    Amount and/or Complexity of Data Reviewed  Labs: ordered. Decision-making details documented in ED Course.    Risk  Prescription drug management.               ED Course as of 11/07/24 2247   Thu Nov 07, 2024 2037 Clue Cells, Wet Prep(!): Rare [ER]   2037 Leukocyte Esterase, UA(!): 250 [ER]   2037 RBC, UA(!): 6-10 [ER]   2037 WBC, UA(!): 6-10 [ER]   2235 Chlamydia trachomatis PCR: Not Detected [ER]   2235 N. gonorrhea PCR: Not Detected [ER]      ED Course User Index  [ER] Dary Barker PA                           Clinical Impression:  Final diagnoses:  [N30.00] Acute cystitis without hematuria (Primary)  [N76.0, B96.89] Bacterial vaginitis          ED Disposition Condition    Eloped                 Dary Barker PA  11/07/24 2247

## 2025-03-06 ENCOUNTER — HOSPITAL ENCOUNTER (EMERGENCY)
Facility: HOSPITAL | Age: 40
Discharge: HOME | End: 2025-03-06
Attending: STUDENT IN AN ORGANIZED HEALTH CARE EDUCATION/TRAINING PROGRAM
Payer: MEDICAID

## 2025-03-06 VITALS
DIASTOLIC BLOOD PRESSURE: 79 MMHG | OXYGEN SATURATION: 98 % | BODY MASS INDEX: 30.21 KG/M2 | TEMPERATURE: 98 F | WEIGHT: 176 LBS | RESPIRATION RATE: 18 BRPM | SYSTOLIC BLOOD PRESSURE: 124 MMHG | HEART RATE: 75 BPM

## 2025-03-06 DIAGNOSIS — N94.9 VAGINAL DISCOMFORT: Primary | ICD-10-CM

## 2025-03-06 LAB
ALBUMIN SERPL-MCNC: 3.6 G/DL (ref 3.5–5)
ALBUMIN/GLOB SERPL: 1.2 RATIO (ref 1.1–2)
ALP SERPL-CCNC: 84 UNIT/L (ref 40–150)
ALT SERPL-CCNC: 12 UNIT/L (ref 0–55)
ANION GAP SERPL CALC-SCNC: 5 MEQ/L
AST SERPL-CCNC: 22 UNIT/L (ref 5–34)
B-HCG UR QL: NEGATIVE
BACTERIA #/AREA URNS AUTO: NORMAL /HPF
BASOPHILS # BLD AUTO: 0.08 X10(3)/MCL
BASOPHILS NFR BLD AUTO: 1 %
BILIRUB SERPL-MCNC: 0.3 MG/DL
BILIRUB UR QL STRIP.AUTO: NEGATIVE
BUN SERPL-MCNC: 10.5 MG/DL (ref 7–18.7)
CALCIUM SERPL-MCNC: 9.4 MG/DL (ref 8.4–10.2)
CHLORIDE SERPL-SCNC: 108 MMOL/L (ref 98–107)
CLARITY UR: CLEAR
CO2 SERPL-SCNC: 25 MMOL/L (ref 22–29)
COLOR UR AUTO: COLORLESS
CREAT SERPL-MCNC: 0.85 MG/DL (ref 0.55–1.02)
CREAT/UREA NIT SERPL: 12
EOSINOPHIL # BLD AUTO: 0.56 X10(3)/MCL (ref 0–0.9)
EOSINOPHIL NFR BLD AUTO: 7.1 %
ERYTHROCYTE [DISTWIDTH] IN BLOOD BY AUTOMATED COUNT: 12.7 % (ref 11.5–17)
GFR SERPLBLD CREATININE-BSD FMLA CKD-EPI: >60 ML/MIN/1.73/M2
GLOBULIN SER-MCNC: 2.9 GM/DL (ref 2.4–3.5)
GLUCOSE SERPL-MCNC: 69 MG/DL (ref 74–100)
GLUCOSE UR QL STRIP: NORMAL
HCT VFR BLD AUTO: 37.3 % (ref 37–47)
HGB BLD-MCNC: 12.7 G/DL (ref 12–16)
HGB UR QL STRIP: NEGATIVE
IMM GRANULOCYTES # BLD AUTO: 0.03 X10(3)/MCL (ref 0–0.04)
IMM GRANULOCYTES NFR BLD AUTO: 0.4 %
KETONES UR QL STRIP: NEGATIVE
LEUKOCYTE ESTERASE UR QL STRIP: NEGATIVE
LYMPHOCYTES # BLD AUTO: 3.2 X10(3)/MCL (ref 0.6–4.6)
LYMPHOCYTES NFR BLD AUTO: 40.8 %
MCH RBC QN AUTO: 29.3 PG (ref 27–31)
MCHC RBC AUTO-ENTMCNC: 34 G/DL (ref 33–36)
MCV RBC AUTO: 86.1 FL (ref 80–94)
MONOCYTES # BLD AUTO: 0.64 X10(3)/MCL (ref 0.1–1.3)
MONOCYTES NFR BLD AUTO: 8.2 %
NEUTROPHILS # BLD AUTO: 3.33 X10(3)/MCL (ref 2.1–9.2)
NEUTROPHILS NFR BLD AUTO: 42.5 %
NITRITE UR QL STRIP: NEGATIVE
NRBC BLD AUTO-RTO: 0 %
PH UR STRIP: 6.5 [PH]
PLATELET # BLD AUTO: 219 X10(3)/MCL (ref 130–400)
PMV BLD AUTO: 10.3 FL (ref 7.4–10.4)
POTASSIUM SERPL-SCNC: 4.2 MMOL/L (ref 3.5–5.1)
PROT SERPL-MCNC: 6.5 GM/DL (ref 6.4–8.3)
PROT UR QL STRIP: NEGATIVE
RBC # BLD AUTO: 4.33 X10(6)/MCL (ref 4.2–5.4)
RBC #/AREA URNS AUTO: NORMAL /HPF
SODIUM SERPL-SCNC: 138 MMOL/L (ref 136–145)
SP GR UR STRIP.AUTO: 1 (ref 1–1.03)
SQUAMOUS #/AREA URNS LPF: NORMAL /HPF
UROBILINOGEN UR STRIP-ACNC: NORMAL
WBC # BLD AUTO: 7.84 X10(3)/MCL (ref 4.5–11.5)
WBC #/AREA URNS AUTO: NORMAL /HPF

## 2025-03-06 PROCEDURE — 81025 URINE PREGNANCY TEST: CPT | Performed by: NURSE PRACTITIONER

## 2025-03-06 PROCEDURE — 81001 URINALYSIS AUTO W/SCOPE: CPT | Performed by: NURSE PRACTITIONER

## 2025-03-06 PROCEDURE — 99283 EMERGENCY DEPT VISIT LOW MDM: CPT

## 2025-03-06 PROCEDURE — 85025 COMPLETE CBC W/AUTO DIFF WBC: CPT | Performed by: NURSE PRACTITIONER

## 2025-03-06 PROCEDURE — 80053 COMPREHEN METABOLIC PANEL: CPT | Performed by: NURSE PRACTITIONER

## 2025-03-06 NOTE — ED PROVIDER NOTES
"Encounter Date: 3/6/2025       History     Chief Complaint   Patient presents with    Vaginal Prolapse     Pt reports "feels like something is coming out of my vagina". Reports has been going on for 3 weeks. C/o mild lower abd pain. Denies dysuria.     The patient is a 39 y.o. female who presents to the Emergency Department with a chief complaint of vaginal pressure. Patient states she feels like something is coming out of her vagina. Symptoms began 2.5 weeks ago and have been constant since onset. Her pain is currently rated as a 0/10 in severity. Associated symptoms include nothing. Symptoms are aggravated with nothing and there are no alleviating factors. The patient denies abdominal pain, dysuria, nausea, or vomiting. She reports taking nothing prior to arrival with no relief of symptoms. No other reported symptoms at this time.      The history is provided by the patient. No  was used.   Vaginal Pain  This is a new problem. The current episode started more than 1 week ago. The problem occurs daily. The problem has not changed since onset.Pertinent negatives include no chest pain, no abdominal pain, no headaches and no shortness of breath. Nothing aggravates the symptoms. Nothing relieves the symptoms. She has tried nothing for the symptoms. The treatment provided no relief.     Review of patient's allergies indicates:   Allergen Reactions    Benadryl allergy decongestant Shortness Of Breath    Diphenhydramine hcl     Sulfa (sulfonamide antibiotics) Swelling     Past Medical History:   Diagnosis Date    Cancer 2003    cervix    Hepatitis C      Past Surgical History:   Procedure Laterality Date    colonoscopy      ESOPHAGOGASTRODUODENOSCOPY      hemorhoidectomy      TONSILLECTOMY      TOTAL KNEE ARTHROPLASTY Left     TUBAL LIGATION       Family History   Problem Relation Name Age of Onset    Cancer Mother      Cancer Maternal Aunt       Social History[1]  Review of Systems   Constitutional:  " Negative for fever.   HENT:  Negative for sore throat.    Respiratory:  Negative for shortness of breath.    Cardiovascular:  Negative for chest pain.   Gastrointestinal:  Negative for abdominal pain and nausea.   Genitourinary:  Positive for vaginal pain. Negative for dysuria, frequency and urgency.   Musculoskeletal:  Negative for back pain.   Skin:  Negative for rash.   Neurological:  Negative for weakness and headaches.   Hematological:  Does not bruise/bleed easily.   All other systems reviewed and are negative.      Physical Exam     Initial Vitals [03/06/25 1314]   BP Pulse Resp Temp SpO2   130/77 89 20 97.5 °F (36.4 °C) 97 %      MAP       --         Physical Exam    Nursing note and vitals reviewed.  Constitutional: She appears well-developed and well-nourished.   HENT:   Head: Normocephalic.   Right Ear: Hearing and tympanic membrane normal.   Left Ear: Hearing and tympanic membrane normal. Mouth/Throat: Uvula is midline, oropharynx is clear and moist and mucous membranes are normal.   Eyes: Conjunctivae and EOM are normal. Pupils are equal, round, and reactive to light.   Cardiovascular:  Normal rate, regular rhythm, normal heart sounds and normal pulses.           Pulmonary/Chest: Effort normal and breath sounds normal.   Abdominal: Abdomen is soft. Bowel sounds are normal. There is no abdominal tenderness.   Genitourinary:    Genitourinary Comments: No obvious prolapse  No cyst or abscess  Chaperone: ARNIE Gonzalez       Lymphadenopathy:     She has no cervical adenopathy.   Neurological: She is alert. GCS eye subscore is 4. GCS verbal subscore is 5. GCS motor subscore is 6.   Skin: Skin is warm, dry and intact. Capillary refill takes less than 2 seconds.         ED Course   Procedures  Labs Reviewed   COMPREHENSIVE METABOLIC PANEL - Abnormal       Result Value    Sodium 138      Potassium 4.2      Chloride 108 (*)     CO2 25      Glucose 69 (*)     Blood Urea Nitrogen 10.5      Creatinine 0.85      Calcium  9.4      Protein Total 6.5      Albumin 3.6      Globulin 2.9      Albumin/Globulin Ratio 1.2      Bilirubin Total 0.3      ALP 84      ALT 12      AST 22      eGFR >60      Anion Gap 5.0      BUN/Creatinine Ratio 12     HCG QUALITATIVE URINE - Normal    hCG Qualitative, Urine Negative     URINALYSIS, REFLEX TO URINE CULTURE - Normal    Color, UA Colorless      Appearance, UA Clear      Specific Gravity, UA 1.005      pH, UA 6.5      Protein, UA Negative      Glucose, UA Normal      Ketones, UA Negative      Blood, UA Negative      Bilirubin, UA Negative      Urobilinogen, UA Normal      Nitrites, UA Negative      Leukocyte Esterase, UA Negative      RBC, UA 0-5      WBC, UA 0-5      Bacteria, UA None Seen      Squamous Epithelial Cells, UA Trace     CBC W/ AUTO DIFFERENTIAL    Narrative:     The following orders were created for panel order CBC Auto Differential.  Procedure                               Abnormality         Status                     ---------                               -----------         ------                     CBC with Differential[[id number]]                           Final result                 Please view results for these tests on the individual orders.   CBC WITH DIFFERENTIAL    WBC 7.84      RBC 4.33      Hgb 12.7      Hct 37.3      MCV 86.1      MCH 29.3      MCHC 34.0      RDW 12.7      Platelet 219      MPV 10.3      Neut % 42.5      Lymph % 40.8      Mono % 8.2      Eos % 7.1      Basophil % 1.0      Imm Grans % 0.4      Neut # 3.33      Lymph # 3.20      Mono # 0.64      Eos # 0.56      Baso # 0.08      Imm Gran # 0.03      NRBC% 0.0            Imaging Results    None          Medications - No data to display  Medical Decision Making  The patient is a 39 y.o. female who presents to the Emergency Department with a chief complaint of vaginal pressure. Patient states she feels like something is coming out of her vagina. Symptoms began 2.5 weeks ago and have been constant since  onset. Her pain is currently rated as a 0/10 in severity. Associated symptoms include nothing. Symptoms are aggravated with nothing and there are no alleviating factors. The patient denies abdominal pain, dysuria, nausea, or vomiting. She reports taking nothing prior to arrival with no relief of symptoms. No other reported symptoms at this time.      Judging by the patient's chief complaint and pertinent history, the patient has the following possible differential diagnoses, including but not limited to the following.  Some of these are deemed to be lower likelihood and some more likely based on my physical exam and history combined with possible lab work and/or imaging studies.   Please see the pertinent studies, and refer to the HPI.  Some of these diagnoses will take further evaluation to fully rule out, perhaps as an outpatient and the patient was encouraged to follow up when discharged for more comprehensive evaluation.    Cyst, abscess, prolapse     Problems Addressed:  Vaginal discomfort: acute illness or injury    Amount and/or Complexity of Data Reviewed  Labs:  Decision-making details documented in ED Course.               ED Course as of 03/06/25 1458   Thu Mar 06, 2025   1354 CBC Auto Differential  CBC unremarkable  [LM]   1354 hCG Qualitative, Urine: Negative [LM]   1354 Urinalysis, Reflex to Urine Culture  UA shows no signs of infection  [LM]      ED Course User Index  [LM] Catrachita Simms, NP                           Clinical Impression:  Final diagnoses:  [N94.9] Vaginal discomfort (Primary)          ED Disposition Condition    Discharge Stable          ED Prescriptions    None       Follow-up Information       Follow up With Specialties Details Why Contact Info    [facility] Schedule an appointment as soon as possible for a visit   [address] [facility] [id number] [phone]                 [1]   Social History  Tobacco Use    Smoking status: Every  Day     Current packs/day: 0.50     Average packs/day: 0.5 packs/day for 13.0 years (6.5 ttl pk-yrs)     Types: Cigarettes, Vaping with nicotine    Smokeless tobacco: Never   Substance Use Topics    Alcohol use: Not Currently     Alcohol/week: 2.0 standard drinks of alcohol     Types: 1 Glasses of wine, 1 Cans of beer per week    Drug use: Not Currently     Comment: 1.5 yrs clean        Catrachita Simms, NP  03/06/25 1456

## 2025-03-06 NOTE — FIRST PROVIDER EVALUATION
"Medical screening examination initiated.  I have conducted a focused provider triage encounter, findings are as follows:    Brief history of present illness:  Patient states difficulty urinating and states "I think my bladder fell."     There were no vitals filed for this visit.    Pertinent physical exam:  Awake, alert, ambulatory      Brief workup plan:  Labs    Preliminary workup initiated; this workup will be continued and followed by the physician or advanced practice provider that is assigned to the patient when roomed.  "

## 2025-04-08 DIAGNOSIS — M54.50 LUMBAR SPINE PAIN: Primary | ICD-10-CM

## 2025-04-09 DIAGNOSIS — Z12.31 ENCOUNTER FOR SCREENING MAMMOGRAM FOR MALIGNANT NEOPLASM OF BREAST: Primary | ICD-10-CM

## (undated) DEVICE — GOWN POLY REINF X-LONG 2XL

## (undated) DEVICE — PADDING WYTEX UNDRCST 6INX4YD

## (undated) DEVICE — SPONGE COTTON TRAY 4X4IN

## (undated) DEVICE — SOL POVIDONE IODINE PCH 3/4OZ

## (undated) DEVICE — COVER TABLE HVY DTY 60X90IN

## (undated) DEVICE — GLOVE SIGNATURE ESSNTL LTX 8.5

## (undated) DEVICE — SYR 10CC LUER LOCK

## (undated) DEVICE — DRAPE STERI U-SHAPED 47X51IN

## (undated) DEVICE — BLADE SAG DUAL CUT 18X90X1.35

## (undated) DEVICE — SUT VICRYL BR 1 GEN 27 CT-1

## (undated) DEVICE — GLOVE SENSICARE PI GRN 7

## (undated) DEVICE — GLOVE SENSICARE PI MICRO 6.5

## (undated) DEVICE — DRESSING TELFA N ADH 3X8IN

## (undated) DEVICE — APPLICATOR CHLORAPREP ORN 26ML

## (undated) DEVICE — KIT SURGICAL TURNOVER

## (undated) DEVICE — GLOVE SENSICARE PI ORTHO LT 8

## (undated) DEVICE — DEVICE STRATAFIX SYMMETRIC +

## (undated) DEVICE — GLOVE SENSICARE PI GRN 8.5

## (undated) DEVICE — Device

## (undated) DEVICE — SUT MONO 2-0 CT-1 VIL

## (undated) DEVICE — SOL NACL IRR 1000ML BTL

## (undated) DEVICE — DRESSING XEROFORM NONADH 1X8IN

## (undated) DEVICE — CONTAINER SPECIMEN SCREW 4OZ

## (undated) DEVICE — ELECTRODE PATIENT RETURN DISP

## (undated) DEVICE — DRAPE FULL SHEET 70X100IN

## (undated) DEVICE — COVER HD BACK TABLE 6FT

## (undated) DEVICE — PAD ABDOMINAL STERILE 8X10IN

## (undated) DEVICE — STAPLER SKIN PROXIMATE WIDE

## (undated) DEVICE — CUFF ATS 2 PORT SNGL BLDR 34IN

## (undated) DEVICE — SOL NACL IRR 3000ML